# Patient Record
Sex: MALE | Race: BLACK OR AFRICAN AMERICAN | NOT HISPANIC OR LATINO | Employment: OTHER | URBAN - METROPOLITAN AREA
[De-identification: names, ages, dates, MRNs, and addresses within clinical notes are randomized per-mention and may not be internally consistent; named-entity substitution may affect disease eponyms.]

---

## 2018-06-28 ENCOUNTER — HOSPITAL ENCOUNTER (INPATIENT)
Facility: HOSPITAL | Age: 68
LOS: 6 days | Discharge: HOME | DRG: 189 | End: 2018-07-04
Attending: EMERGENCY MEDICINE | Admitting: INTERNAL MEDICINE
Payer: MEDICARE

## 2018-06-28 DIAGNOSIS — J44.1 COPD WITH ACUTE EXACERBATION: Primary | ICD-10-CM

## 2018-06-28 DIAGNOSIS — J44.1 COPD EXACERBATION: ICD-10-CM

## 2018-06-28 DIAGNOSIS — R06.02 SOB (SHORTNESS OF BREATH): ICD-10-CM

## 2018-06-28 DIAGNOSIS — R79.89 ELEVATED TROPONIN: ICD-10-CM

## 2018-06-28 DIAGNOSIS — D86.9 SARCOIDOSIS: ICD-10-CM

## 2018-06-28 LAB
ALBUMIN SERPL BCP-MCNC: 3.4 G/DL
ALLENS TEST: ABNORMAL
ALP SERPL-CCNC: 71 U/L
ALT SERPL W/O P-5'-P-CCNC: 20 U/L
ANION GAP SERPL CALC-SCNC: 14 MMOL/L
AST SERPL-CCNC: 18 U/L
BASOPHILS # BLD AUTO: 0.01 K/UL
BASOPHILS NFR BLD: 0.1 %
BILIRUB SERPL-MCNC: 1.1 MG/DL
BNP SERPL-MCNC: 674 PG/ML
BUN SERPL-MCNC: 14 MG/DL
CALCIUM SERPL-MCNC: 9.6 MG/DL
CHLORIDE SERPL-SCNC: 102 MMOL/L
CO2 SERPL-SCNC: 23 MMOL/L
CREAT SERPL-MCNC: 1.2 MG/DL
DELSYS: ABNORMAL
DIFFERENTIAL METHOD: ABNORMAL
EOSINOPHIL # BLD AUTO: 0.1 K/UL
EOSINOPHIL NFR BLD: 0.6 %
ERYTHROCYTE [DISTWIDTH] IN BLOOD BY AUTOMATED COUNT: 14.6 %
EST. GFR  (AFRICAN AMERICAN): >60 ML/MIN/1.73 M^2
EST. GFR  (NON AFRICAN AMERICAN): >60 ML/MIN/1.73 M^2
FLOW: 4
GLUCOSE SERPL-MCNC: 126 MG/DL
HCO3 UR-SCNC: 29.1 MMOL/L (ref 24–28)
HCT VFR BLD AUTO: 44.5 %
HGB BLD-MCNC: 14.9 G/DL
INR PPP: 1.1
LYMPHOCYTES # BLD AUTO: 0.8 K/UL
LYMPHOCYTES NFR BLD: 5.9 %
MCH RBC QN AUTO: 28.2 PG
MCHC RBC AUTO-ENTMCNC: 33.5 G/DL
MCV RBC AUTO: 84 FL
MODE: ABNORMAL
MONOCYTES # BLD AUTO: 1 K/UL
MONOCYTES NFR BLD: 7.1 %
NEUTROPHILS # BLD AUTO: 12 K/UL
NEUTROPHILS NFR BLD: 86.3 %
PCO2 BLDA: 40.7 MMHG (ref 35–45)
PH SMN: 7.46 [PH] (ref 7.35–7.45)
PLATELET # BLD AUTO: 181 K/UL
PMV BLD AUTO: 9.8 FL
PO2 BLDA: 23 MMHG (ref 40–60)
POC BE: 5 MMOL/L
POC SATURATED O2: 45 % (ref 95–100)
POC TCO2: 30 MMOL/L (ref 24–29)
POTASSIUM SERPL-SCNC: 3.5 MMOL/L
PROT SERPL-MCNC: 8.4 G/DL
PROTHROMBIN TIME: 12 SEC
RBC # BLD AUTO: 5.29 M/UL
SAMPLE: ABNORMAL
SODIUM SERPL-SCNC: 139 MMOL/L
SP02: 96
TROPONIN I SERPL DL<=0.01 NG/ML-MCNC: 0.15 NG/ML
WBC # BLD AUTO: 14.01 K/UL

## 2018-06-28 PROCEDURE — 96361 HYDRATE IV INFUSION ADD-ON: CPT

## 2018-06-28 PROCEDURE — 85610 PROTHROMBIN TIME: CPT

## 2018-06-28 PROCEDURE — 25500020 PHARM REV CODE 255: Performed by: EMERGENCY MEDICINE

## 2018-06-28 PROCEDURE — 99900035 HC TECH TIME PER 15 MIN (STAT)

## 2018-06-28 PROCEDURE — 82803 BLOOD GASES ANY COMBINATION: CPT

## 2018-06-28 PROCEDURE — 63600175 PHARM REV CODE 636 W HCPCS: Performed by: EMERGENCY MEDICINE

## 2018-06-28 PROCEDURE — 96365 THER/PROPH/DIAG IV INF INIT: CPT

## 2018-06-28 PROCEDURE — 93010 ELECTROCARDIOGRAM REPORT: CPT | Mod: ,,, | Performed by: INTERNAL MEDICINE

## 2018-06-28 PROCEDURE — 80053 COMPREHEN METABOLIC PANEL: CPT

## 2018-06-28 PROCEDURE — 25000242 PHARM REV CODE 250 ALT 637 W/ HCPCS: Performed by: EMERGENCY MEDICINE

## 2018-06-28 PROCEDURE — 99285 EMERGENCY DEPT VISIT HI MDM: CPT | Mod: 25

## 2018-06-28 PROCEDURE — 27000221 HC OXYGEN, UP TO 24 HOURS

## 2018-06-28 PROCEDURE — 21400001 HC TELEMETRY ROOM

## 2018-06-28 PROCEDURE — 84484 ASSAY OF TROPONIN QUANT: CPT

## 2018-06-28 PROCEDURE — 96375 TX/PRO/DX INJ NEW DRUG ADDON: CPT

## 2018-06-28 PROCEDURE — 85025 COMPLETE CBC W/AUTO DIFF WBC: CPT

## 2018-06-28 PROCEDURE — 25000003 PHARM REV CODE 250: Performed by: EMERGENCY MEDICINE

## 2018-06-28 PROCEDURE — 94761 N-INVAS EAR/PLS OXIMETRY MLT: CPT

## 2018-06-28 PROCEDURE — 94640 AIRWAY INHALATION TREATMENT: CPT

## 2018-06-28 PROCEDURE — 93005 ELECTROCARDIOGRAM TRACING: CPT

## 2018-06-28 PROCEDURE — 83880 ASSAY OF NATRIURETIC PEPTIDE: CPT

## 2018-06-28 RX ORDER — IPRATROPIUM BROMIDE AND ALBUTEROL SULFATE 2.5; .5 MG/3ML; MG/3ML
3 SOLUTION RESPIRATORY (INHALATION)
Status: COMPLETED | OUTPATIENT
Start: 2018-06-28 | End: 2018-06-28

## 2018-06-28 RX ORDER — CIPROFLOXACIN 2 MG/ML
400 INJECTION, SOLUTION INTRAVENOUS
Status: DISCONTINUED | OUTPATIENT
Start: 2018-06-29 | End: 2018-06-29

## 2018-06-28 RX ORDER — VALSARTAN 40 MG/1
20 TABLET ORAL DAILY
COMMUNITY

## 2018-06-28 RX ORDER — CIPROFLOXACIN 2 MG/ML
400 INJECTION, SOLUTION INTRAVENOUS
Status: DISCONTINUED | OUTPATIENT
Start: 2018-06-28 | End: 2018-06-28

## 2018-06-28 RX ORDER — ALBUTEROL SULFATE 2.5 MG/.5ML
5 SOLUTION RESPIRATORY (INHALATION)
Status: COMPLETED | OUTPATIENT
Start: 2018-06-28 | End: 2018-06-28

## 2018-06-28 RX ORDER — ASPIRIN 325 MG
325 TABLET, DELAYED RELEASE (ENTERIC COATED) ORAL
Status: COMPLETED | OUTPATIENT
Start: 2018-06-28 | End: 2018-06-28

## 2018-06-28 RX ORDER — METHYLPREDNISOLONE SOD SUCC 125 MG
125 VIAL (EA) INJECTION
Status: COMPLETED | OUTPATIENT
Start: 2018-06-28 | End: 2018-06-28

## 2018-06-28 RX ORDER — CIPROFLOXACIN 2 MG/ML
400 INJECTION, SOLUTION INTRAVENOUS ONCE
Status: COMPLETED | OUTPATIENT
Start: 2018-06-28 | End: 2018-06-28

## 2018-06-28 RX ORDER — PREDNISONE 5 MG/1
5 TABLET ORAL DAILY
COMMUNITY
End: 2023-07-11 | Stop reason: DRUGHIGH

## 2018-06-28 RX ORDER — IPRATROPIUM BROMIDE AND ALBUTEROL SULFATE 2.5; .5 MG/3ML; MG/3ML
3 SOLUTION RESPIRATORY (INHALATION) EVERY 6 HOURS PRN
Status: DISCONTINUED | OUTPATIENT
Start: 2018-06-29 | End: 2018-06-29

## 2018-06-28 RX ADMIN — ASPIRIN 325 MG: 325 TABLET, DELAYED RELEASE ORAL at 06:06

## 2018-06-28 RX ADMIN — CIPROFLOXACIN 400 MG: 2 INJECTION, SOLUTION INTRAVENOUS at 06:06

## 2018-06-28 RX ADMIN — IPRATROPIUM BROMIDE AND ALBUTEROL SULFATE 3 ML: .5; 2.5 SOLUTION RESPIRATORY (INHALATION) at 05:06

## 2018-06-28 RX ADMIN — METHYLPREDNISOLONE SODIUM SUCCINATE 125 MG: 125 INJECTION, POWDER, FOR SOLUTION INTRAMUSCULAR; INTRAVENOUS at 05:06

## 2018-06-28 RX ADMIN — SODIUM CHLORIDE 500 ML: 0.9 INJECTION, SOLUTION INTRAVENOUS at 05:06

## 2018-06-28 RX ADMIN — ALBUTEROL SULFATE 5 MG: 2.5 SOLUTION RESPIRATORY (INHALATION) at 08:06

## 2018-06-28 RX ADMIN — IOHEXOL 75 ML: 350 INJECTION, SOLUTION INTRAVENOUS at 07:06

## 2018-06-28 NOTE — ED PROVIDER NOTES
"Encounter Date: 6/28/2018  SORT MSE:  Pt is a 68 y.o. male who presents for emergent consideration for sob with 75% pulse ox. Pt will be moved to room when one is available, otherwise will wait in waiting room with triage nurse supervision.  Pt arrived by wheelchair. He is in distress. Orders have been placed. MERRICK Ramirez DNP [facility] 06/28/2018 1636       History     Chief Complaint   Patient presents with    Shortness of Breath     Sob for the past "few weeks" progressively worse     HPI     This 67 y/o male with prostate cancer s/p radioactive seed implants, HTN, pulmonary mycobacterial infection, sarcoidosis of lung, sick sinus syndrome s/p pacemaker placement who presents today for SOB and chest tightness.    The patient is c/o SOB and chest tightness for the past few wks. His symptoms worsen whenever he stands up and takes a few steps; resting alleviates his symptoms. The patient presented to ER in  on 6/20/18 c/o SOB and malodorous urine. He was given a breathing treatment at that facility and prescribed an unknown abx, which he started 5 days ago, for a UTI. The patient attempted x1 nebulizer breathing treatment at 8AM today and used his albuterol pump at 11 AM today with no relief to his symptoms. The patient reports 2L O2 at home, which he's had for 6 months. He states his COPD is well controlled. The patient is prescribed tacrolimus for his hx of sarcoidosis and prednisone for his hx of COPD. The patient denies hx of admission due to COPD. The patient denies smoking cigarettes.     The pt had a cardiac PET scan in 2014 that revealed normal LV function >51% with no myocardial ischemia or injury.    No fever, no chills, no N/V, no urinary/GI complaints, no headaches, no hx of DM, no hx of HTN, no hx of CHF, denies hx of DVT or PE, denies any prior sarcoid flairs, prior hx of MI prior ot     No recent hospitalizations.    Review of patient's allergies indicates:   Allergen Reactions    Simvastatin Other " (See Comments)     Muscular pain      Valium [diazepam] Palpitations     Past Medical History:   Diagnosis Date    Cancer     Prostate    Glaucoma     H/O prostate cancer     s/p radioactive seed implants    HTN (hypertension)     Pulmonary mycobacterial infection     Renal calculi     Sarcoidosis of lung     Sick sinus syndrome     s/p pacemaker placement     Past Surgical History:   Procedure Laterality Date    CARDIAC CATHETERIZATION  8-22-13    normal cornary arteries, no pulmonary HTN    CARDIAC PACEMAKER PLACEMENT      EYE SURGERY      heart biopsy      no evidence of sarcoid    prostate seed implants       Family History   Problem Relation Age of Onset    Heart disease Mother     Diabetes Mother     Cancer Father         lung    Colon cancer Neg Hx      Social History   Substance Use Topics    Smoking status: Never Smoker    Smokeless tobacco: Never Used    Alcohol use No     Review of Systems  Review of Systems   Constitutional: Negative for fevers, chills, diaphoresis and fever.   HENT: Negative for congestion, drooling, ear pain, nosebleeds, sore throat and trouble swallowing.    Eyes: Negative for photophobia, pain and discharge.   Respiratory: Per HPI.  Cardiovascular: Per HPI.   Gastrointestinal: Negative for abdominal pain, diarrhea, nausea and vomiting.   Genitourinary: Negative for difficulty urinating, dysuria, flank pain, frequency and hematuria.   Musculoskeletal: Negative for back pain, myalgias and neck pain.   Skin: Negative for rash.   Neurological: Negative for dizziness, seizures, syncope and headaches.   Psychiatric/Behavioral: Negative for behavioral problems.   Physical Exam     Initial Vitals   BP Pulse Resp Temp SpO2   06/28/18 1642 06/28/18 1640 06/28/18 1640 -- 06/28/18 1640   (!) 162/91 (!) 125 (!) 32  (!) 76 %      MAP       --                Physical Exam  Constitutional: Pt appears well-developed and well-nourished. Mild to moderate respiratory distress.    HENT:   Head: Normocephalic and atraumatic.   Mouth/Throat: No oropharyngeal exudate.   Eyes: Conjunctivae and EOM are normal. Pupils are equal, round, and reactive to light. No scleral icterus.   Neck: Normal range of motion. Neck supple. No JVD present.   Cardiovascular: Normal rate, regular rhythm and normal heart sounds. Exam reveals no gallop and no friction rub.    No murmur heard.  Pulmonary/Chest: No stridor. Pt has no wheezes. Pt has no rhonchi. Diminished breath sounds to all 4 lung fields. Mild to moderate respiratory distress. Pt is on 4L O2 with 94% sat.  Abdominal: Soft. Bowel sounds are normal. Pt exhibits no distension and no mass. There is no tenderness. There is no rebound and no guarding.   Musculoskeletal: Normal range of motion. Pt exhibits no edema or tenderness.   Lymphadenopathy:     Pt has no cervical adenopathy.   Neurological: Pt is alert and oriented to person, place, and time. Pt has normal strength and normal reflexes. Pt displays normal reflexes. No cranial nerve deficit or sensory deficit.   Skin: Skin is warm and dry. Capillary refill takes less than 2 seconds. No rash and no abscess noted. No erythema.     ED Course   Procedures  Labs Reviewed   COMPREHENSIVE METABOLIC PANEL - Abnormal; Notable for the following:        Result Value    Glucose 126 (*)     Albumin 3.4 (*)     Total Bilirubin 1.1 (*)     All other components within normal limits   CBC W/ AUTO DIFFERENTIAL - Abnormal; Notable for the following:     WBC 14.01 (*)     RDW 14.6 (*)     Gran # (ANC) 12.0 (*)     Lymph # 0.8 (*)     Gran% 86.3 (*)     Lymph% 5.9 (*)     All other components within normal limits   TROPONIN I - Abnormal; Notable for the following:     Troponin I 0.151 (*)     All other components within normal limits   B-TYPE NATRIURETIC PEPTIDE - Abnormal; Notable for the following:      (*)     All other components within normal limits   ISTAT PROCEDURE - Abnormal; Notable for the following:      POC PH 7.462 (*)     POC PO2 23 (*)     POC HCO3 29.1 (*)     POC SATURATED O2 45 (*)     POC TCO2 30 (*)     All other components within normal limits   PROTIME-INR   TROPONIN I          Imaging Results          CTA Chest Non-Coronary (PE Study) (Final result)  Result time 06/28/18 19:23:20    Final result by Eamon Mcintosh MD (06/28/18 19:23:20)                 Impression:      No evidence of central pulmonary embolism.  Distal and subsegmental pulmonary emboli are not excluded.  Suggest correlation with DVT study and additional clinical parameters.    Enlargement of the pulmonary trunk and main pulmonary arteries.    Small bilateral pleural effusions with some loculations to the effusions.    Cavitary lesions in the upper lobes with associated scarring along with mediastinal lymphadenopathy.  The findings represent granulomatous disease (possible tuberculosis and sarcoidosis). Suggest clinical correlation.    Diffuse ground-glass airspace opacities.    Additional findings as above.      Electronically signed by: Eamon Mcintosh MD  Date:    06/28/2018  Time:    19:23             Narrative:    EXAMINATION:  CTA CHEST NON CORONARY    CLINICAL HISTORY:  Chest pain, acute, PE suspected, high pretest prob;    TECHNIQUE:  Low dose axial images, sagittal and coronal reformations were obtained from the thoracic inlet to the lung bases following the IV administration of 75 mL of Omnipaque 350.  Contrast timing was optimized to evaluate the pulmonary arteries.  MIP images were performed.    COMPARISON:  Chest x-ray dated 06/20/2018.    FINDINGS:  CT pulmonary embolism examination:    There are no filling defects within the central pulmonary tree to suggest pulmonary embolism.  The distal and subsegmental pulmonary tree are poorly visualized secondary to motion.  There is dilatation of the pulmonary trunk and main pulmonary arteries.    CT chest examination:    The thyroid gland is unremarkable.  The base of the neck is within  normal limits.    The trachea is within normal limits.  No definitive endobronchial lesions identified.  There is bronchiectasis involving the bilateral lower lobes.    The heart is enlarged.  There is reflux of contrast into the IVC and the hepatic veins.  There is left-sided approach dual lead pacemaker with the tip terminating in the right atrial appendage and right ventricle.  No pericardial effusions are present.  There are scattered coronary artery calcifications.    The ascending thoracic aorta measures 3.5 x 3.5 cm.  The descending thoracic aorta measures 2.7 x 2.7 cm.  There is no intimal flap to suggest a dissection.  There is no evidence of a penetrating aortic ulcer.  The great vessels arising from the aortic arch are within normal limits.    There are partially calcified prevascular, para-aortic, paratracheal, and subcarinal lymph nodes.  No definitive enlarged lymph nodes identified in the hilar lymph node stations.  The axillary regions are within normal limits.    There are small bilateral pleural effusions.  The effusions appear to be loculated especially those involving the apical segments of the upper lobes.  There is no evidence of a pneumothorax.  There is no evidence of pneumomediastinum.  There are diffuse ground-glass airspace opacities throughout the lung fields.  There are cavitary lesions in the bilateral upper lobes with associated scarring.  No significant soft tissue component is identified.    The esophagus is within normal limits.  The visualized upper abdominal structures are unremarkable.  No evidence of free air identified in the upper abdomen.    The chest wall is unremarkable.  There are degenerative changes in the osseous structures.                               X-Ray Chest AP Portable (Final result)  Result time 06/28/18 17:23:10    Final result by Kevin Zaragoza MD (06/28/18 17:23:10)                 Impression:      Findings suggestive mild pulmonary edema.    Mild  amount of opacity in the right lower lung zone which may represent pulmonary edema pneumonia, or atelectasis.      Electronically signed by: Kevin Zaragoza MD  Date:    06/28/2018  Time:    17:23             Narrative:    EXAMINATION:  XR CHEST AP PORTABLE    CLINICAL HISTORY:  Shortness of breath    TECHNIQUE:  Single frontal view of the chest was performed.    COMPARISON:  None    FINDINGS:  Cardiac silhouette is at the upper limits of normal in size.  Tortuosity of the thoracic aorta.  Cardiac pacer with leads in the right atrium and right ventricle.  Prominence of the pulmonary vasculature with scattered interstitial lung markings which may represent mild pulmonary edema.  Patchy opacity in the right lower lung zone which may represent pneumonia, atelectasis, or pulmonary edema.  Trace left pleural effusion.  Degenerative changes of the thoracic spine.                                                      Clinical Impression:   The primary encounter diagnosis was Sarcoidosis. Diagnoses of SOB (shortness of breath), COPD exacerbation, and Elevated troponin were also pertinent to this visit.    MD NOTE, 5:10pm: EKG showed 118 bpm, sinus tachycardia, incomplete right bundle branch block, NO STEMI.    MD NOTE, 5:24pm: Pt presents with initial sats of 76% in triage, he was in mild to moderate respiratory distress, I placed him on 4 L and he is stable at 90%, he lives at 2L, his lungs are clear so unclear if this is a COPD exacerbation (most likely) vs. PE presentation vs. CHF (no hx of CHF, no JVD, no crackles, no edema, last EF as noted >50%), will get full labs, trops, check H/H, check Ct, further check CXR/CTA to r/o PNA and PE, pt also reports CP and ACS hx with PM placement and prior SSS, so will need admission both for respiratory failure + CP rule out, pt remains HD stable, IVF and Abx initiated      MD NOTE, 8:02pm: Pt stable nad vss, pt is stable now on double 02 requirements, 4L, normally on 2L, when he  stands or tries to walks he gets extremely SOB, pt has a leukocytosis of unclear etiology, on Cipro currently for G- coverage for COPD flair, H/H stable, INR 1.1, CMP unimpressive, sugars 126, of note troponin 0.151, possibly demand ischemia but will certainly need trending given hx of CP and SOB, his BNP is elevated at 674 and mild PVC on CXR, will give 40 IV Lasix here, VBG is reassuring, suspect COPD exacerbation vs. Sarcoidosis flair, pt did have improvement with one round of nebs so will administer one more, s/p IV steroids, otherwise will page the hospitalist for admission    MD NOTE, 8:45pm: Pt stable nad vss, I spoke to Dr. Brand who accepts the admission, we discussed all labs, imaging, hx and physical examination, and vitals, he agrees with plan of care for nebs, steroids, Abx, patient is currently is stable in NAD, of note there is no temperature charted so advised nursing to record the temperature obtained in triage, otherwise pt stable on admission, labs and VS as noted, and stable on admission              Leonela Mohr MD  06/28/18 2052

## 2018-06-28 NOTE — ED TRIAGE NOTES
Pt and wife were moving today from [place] to [place]. Pt became SOB with activity and home O2 did not relieve distress. Pt came in with sats in the lower 80's to ED room.

## 2018-06-28 NOTE — PROGRESS NOTES
Results for MART LAMA (MRN [id number]) as of 6/28/2018 18:04   Ref. Range 6/28/2018 17:55   POC PH Latest Ref Range: 7.35 - 7.45  7.462 (H)   POC PCO2 Latest Ref Range: 35 - 45 mmHg 40.7   POC PO2 Latest Ref Range: 40 - 60 mmHg 23 (LL)   POC BE Latest Ref Range: -2 to 2 mmol/L 5   POC HCO3 Latest Ref Range: 24 - 28 mmol/L 29.1 (H)   POC SATURATED O2 Latest Ref Range: 95 - 100 % 45 (L)   POC TCO2 Latest Ref Range: 24 - 29 mmol/L 30 (H)   Flow Unknown 4   Sample Unknown VENOUS   DelSys Unknown Nasal Can   Allens Test Unknown N/A   Mode Unknown SPONT

## 2018-06-29 PROBLEM — Z85.46 HISTORY OF PROSTATE CANCER: Chronic | Status: ACTIVE | Noted: 2018-06-29

## 2018-06-29 PROBLEM — J96.11 CHRONIC RESPIRATORY FAILURE WITH HYPOXIA: Chronic | Status: ACTIVE | Noted: 2018-06-29

## 2018-06-29 PROBLEM — J44.1 COPD WITH ACUTE EXACERBATION: Status: ACTIVE | Noted: 2018-06-29

## 2018-06-29 PROBLEM — R79.89 ELEVATED TROPONIN: Status: ACTIVE | Noted: 2018-06-29

## 2018-06-29 PROBLEM — J44.9 COPD (CHRONIC OBSTRUCTIVE PULMONARY DISEASE): Chronic | Status: ACTIVE | Noted: 2018-06-29

## 2018-06-29 PROBLEM — Z86.79 HISTORY OF SICK SINUS SYNDROME: Chronic | Status: ACTIVE | Noted: 2018-06-29

## 2018-06-29 PROBLEM — I10 ESSENTIAL HYPERTENSION: Chronic | Status: ACTIVE | Noted: 2018-06-29

## 2018-06-29 PROBLEM — E78.5 HYPERLIPIDEMIA: Chronic | Status: ACTIVE | Noted: 2018-06-29

## 2018-06-29 PROBLEM — D86.9 SARCOIDOSIS: Chronic | Status: ACTIVE | Noted: 2018-06-28

## 2018-06-29 PROBLEM — Z95.0 PRESENCE OF PERMANENT CARDIAC PACEMAKER: Chronic | Status: ACTIVE | Noted: 2018-06-29

## 2018-06-29 LAB
ALBUMIN SERPL BCP-MCNC: 3 G/DL
ALP SERPL-CCNC: 62 U/L
ALT SERPL W/O P-5'-P-CCNC: 16 U/L
ANION GAP SERPL CALC-SCNC: 12 MMOL/L
AST SERPL-CCNC: 12 U/L
BASOPHILS # BLD AUTO: 0 K/UL
BASOPHILS NFR BLD: 0 %
BILIRUB SERPL-MCNC: 0.7 MG/DL
BUN SERPL-MCNC: 16 MG/DL
CALCIUM SERPL-MCNC: 9.6 MG/DL
CHLORIDE SERPL-SCNC: 104 MMOL/L
CO2 SERPL-SCNC: 24 MMOL/L
CREAT SERPL-MCNC: 1.4 MG/DL
DIFFERENTIAL METHOD: ABNORMAL
EOSINOPHIL # BLD AUTO: 0 K/UL
EOSINOPHIL NFR BLD: 0 %
ERYTHROCYTE [DISTWIDTH] IN BLOOD BY AUTOMATED COUNT: 14.6 %
EST. GFR  (AFRICAN AMERICAN): >60 ML/MIN/1.73 M^2
EST. GFR  (NON AFRICAN AMERICAN): 52 ML/MIN/1.73 M^2
GLUCOSE SERPL-MCNC: 208 MG/DL
HCT VFR BLD AUTO: 43 %
HGB BLD-MCNC: 13.9 G/DL
LYMPHOCYTES # BLD AUTO: 0.3 K/UL
LYMPHOCYTES NFR BLD: 3.6 %
MAGNESIUM SERPL-MCNC: 2 MG/DL
MCH RBC QN AUTO: 28.4 PG
MCHC RBC AUTO-ENTMCNC: 32.3 G/DL
MCV RBC AUTO: 88 FL
MONOCYTES # BLD AUTO: 0.2 K/UL
MONOCYTES NFR BLD: 2 %
NEUTROPHILS # BLD AUTO: 8.4 K/UL
NEUTROPHILS NFR BLD: 94.4 %
PHOSPHATE SERPL-MCNC: 2.7 MG/DL
PLATELET # BLD AUTO: 155 K/UL
PMV BLD AUTO: 10.5 FL
POTASSIUM SERPL-SCNC: 5 MMOL/L
PROT SERPL-MCNC: 8 G/DL
RBC # BLD AUTO: 4.89 M/UL
SODIUM SERPL-SCNC: 140 MMOL/L
TROPONIN I SERPL DL<=0.01 NG/ML-MCNC: 0.13 NG/ML
TROPONIN I SERPL DL<=0.01 NG/ML-MCNC: 0.15 NG/ML
WBC # BLD AUTO: 8.87 K/UL

## 2018-06-29 PROCEDURE — 84100 ASSAY OF PHOSPHORUS: CPT

## 2018-06-29 PROCEDURE — G0009 ADMIN PNEUMOCOCCAL VACCINE: HCPCS | Performed by: INTERNAL MEDICINE

## 2018-06-29 PROCEDURE — 63600175 PHARM REV CODE 636 W HCPCS: Performed by: INTERNAL MEDICINE

## 2018-06-29 PROCEDURE — 25000003 PHARM REV CODE 250: Performed by: INTERNAL MEDICINE

## 2018-06-29 PROCEDURE — 80053 COMPREHEN METABOLIC PANEL: CPT

## 2018-06-29 PROCEDURE — 84484 ASSAY OF TROPONIN QUANT: CPT | Mod: 91

## 2018-06-29 PROCEDURE — 27000221 HC OXYGEN, UP TO 24 HOURS

## 2018-06-29 PROCEDURE — 83735 ASSAY OF MAGNESIUM: CPT

## 2018-06-29 PROCEDURE — 90670 PCV13 VACCINE IM: CPT | Performed by: INTERNAL MEDICINE

## 2018-06-29 PROCEDURE — 85025 COMPLETE CBC W/AUTO DIFF WBC: CPT

## 2018-06-29 PROCEDURE — 94640 AIRWAY INHALATION TREATMENT: CPT

## 2018-06-29 PROCEDURE — 21400001 HC TELEMETRY ROOM

## 2018-06-29 PROCEDURE — 94761 N-INVAS EAR/PLS OXIMETRY MLT: CPT

## 2018-06-29 PROCEDURE — 84484 ASSAY OF TROPONIN QUANT: CPT

## 2018-06-29 PROCEDURE — 25000242 PHARM REV CODE 250 ALT 637 W/ HCPCS: Performed by: INTERNAL MEDICINE

## 2018-06-29 PROCEDURE — 3E0234Z INTRODUCTION OF SERUM, TOXOID AND VACCINE INTO MUSCLE, PERCUTANEOUS APPROACH: ICD-10-PCS | Performed by: INTERNAL MEDICINE

## 2018-06-29 PROCEDURE — 36415 COLL VENOUS BLD VENIPUNCTURE: CPT

## 2018-06-29 PROCEDURE — 90471 IMMUNIZATION ADMIN: CPT | Performed by: INTERNAL MEDICINE

## 2018-06-29 RX ORDER — GUAIFENESIN 600 MG/1
600 TABLET, EXTENDED RELEASE ORAL 2 TIMES DAILY
Status: DISCONTINUED | OUTPATIENT
Start: 2018-06-29 | End: 2018-07-04 | Stop reason: HOSPADM

## 2018-06-29 RX ORDER — IPRATROPIUM BROMIDE AND ALBUTEROL SULFATE 2.5; .5 MG/3ML; MG/3ML
3 SOLUTION RESPIRATORY (INHALATION)
Status: DISCONTINUED | OUTPATIENT
Start: 2018-06-29 | End: 2018-06-30

## 2018-06-29 RX ORDER — IPRATROPIUM BROMIDE AND ALBUTEROL SULFATE 2.5; .5 MG/3ML; MG/3ML
3 SOLUTION RESPIRATORY (INHALATION) EVERY 4 HOURS PRN
Status: DISCONTINUED | OUTPATIENT
Start: 2018-06-29 | End: 2018-06-30

## 2018-06-29 RX ORDER — LISINOPRIL 5 MG/1
5 TABLET ORAL DAILY
Status: DISCONTINUED | OUTPATIENT
Start: 2018-06-29 | End: 2018-06-29

## 2018-06-29 RX ORDER — PANTOPRAZOLE SODIUM 40 MG/1
40 TABLET, DELAYED RELEASE ORAL DAILY
Status: DISCONTINUED | OUTPATIENT
Start: 2018-06-29 | End: 2018-07-04 | Stop reason: HOSPADM

## 2018-06-29 RX ORDER — NAPROXEN SODIUM 220 MG/1
81 TABLET, FILM COATED ORAL DAILY
Status: DISCONTINUED | OUTPATIENT
Start: 2018-06-29 | End: 2018-07-04 | Stop reason: HOSPADM

## 2018-06-29 RX ORDER — METOPROLOL SUCCINATE 50 MG/1
200 TABLET, EXTENDED RELEASE ORAL DAILY
Status: DISCONTINUED | OUTPATIENT
Start: 2018-06-29 | End: 2018-06-29

## 2018-06-29 RX ORDER — RAMELTEON 8 MG/1
8 TABLET ORAL NIGHTLY PRN
Status: DISCONTINUED | OUTPATIENT
Start: 2018-06-29 | End: 2018-07-04 | Stop reason: HOSPADM

## 2018-06-29 RX ORDER — METHYLPREDNISOLONE SOD SUCC 125 MG
80 VIAL (EA) INJECTION EVERY 8 HOURS
Status: DISCONTINUED | OUTPATIENT
Start: 2018-06-29 | End: 2018-07-01

## 2018-06-29 RX ORDER — METHYLPREDNISOLONE SOD SUCC 125 MG
125 VIAL (EA) INJECTION EVERY 8 HOURS
Status: DISCONTINUED | OUTPATIENT
Start: 2018-06-29 | End: 2018-06-29

## 2018-06-29 RX ORDER — METOPROLOL SUCCINATE 50 MG/1
100 TABLET, EXTENDED RELEASE ORAL DAILY
Status: DISCONTINUED | OUTPATIENT
Start: 2018-06-30 | End: 2018-07-04 | Stop reason: HOSPADM

## 2018-06-29 RX ORDER — PRAVASTATIN SODIUM 40 MG/1
40 TABLET ORAL DAILY
Status: DISCONTINUED | OUTPATIENT
Start: 2018-06-29 | End: 2018-06-30

## 2018-06-29 RX ORDER — CLONIDINE HYDROCHLORIDE 0.1 MG/1
0.1 TABLET ORAL 3 TIMES DAILY PRN
Status: DISCONTINUED | OUTPATIENT
Start: 2018-06-29 | End: 2018-07-04 | Stop reason: HOSPADM

## 2018-06-29 RX ORDER — AMLODIPINE BESYLATE 5 MG/1
5 TABLET ORAL DAILY
Status: DISCONTINUED | OUTPATIENT
Start: 2018-06-29 | End: 2018-06-30

## 2018-06-29 RX ORDER — ONDANSETRON 2 MG/ML
8 INJECTION INTRAMUSCULAR; INTRAVENOUS EVERY 8 HOURS PRN
Status: DISCONTINUED | OUTPATIENT
Start: 2018-06-29 | End: 2018-07-04 | Stop reason: HOSPADM

## 2018-06-29 RX ORDER — AMOXICILLIN 250 MG
1 CAPSULE ORAL 2 TIMES DAILY PRN
Status: DISCONTINUED | OUTPATIENT
Start: 2018-06-29 | End: 2018-07-04 | Stop reason: HOSPADM

## 2018-06-29 RX ADMIN — LISINOPRIL 5 MG: 5 TABLET ORAL at 09:06

## 2018-06-29 RX ADMIN — GUAIFENESIN 600 MG: 600 TABLET, EXTENDED RELEASE ORAL at 09:06

## 2018-06-29 RX ADMIN — PANTOPRAZOLE SODIUM 40 MG: 40 TABLET, DELAYED RELEASE ORAL at 09:06

## 2018-06-29 RX ADMIN — AMLODIPINE BESYLATE 5 MG: 5 TABLET ORAL at 09:06

## 2018-06-29 RX ADMIN — IPRATROPIUM BROMIDE AND ALBUTEROL SULFATE 3 ML: .5; 2.5 SOLUTION RESPIRATORY (INHALATION) at 08:06

## 2018-06-29 RX ADMIN — PNEUMOCOCCAL 13-VALENT CONJUGATE VACCINE 0.5 ML: 2.2; 2.2; 2.2; 2.2; 2.2; 4.4; 2.2; 2.2; 2.2; 2.2; 2.2; 2.2; 2.2 INJECTION, SUSPENSION INTRAMUSCULAR at 09:06

## 2018-06-29 RX ADMIN — METHYLPREDNISOLONE SODIUM SUCCINATE 80 MG: 125 INJECTION, POWDER, FOR SOLUTION INTRAMUSCULAR; INTRAVENOUS at 09:06

## 2018-06-29 RX ADMIN — IPRATROPIUM BROMIDE AND ALBUTEROL SULFATE 3 ML: .5; 2.5 SOLUTION RESPIRATORY (INHALATION) at 11:06

## 2018-06-29 RX ADMIN — IPRATROPIUM BROMIDE AND ALBUTEROL SULFATE 3 ML: .5; 2.5 SOLUTION RESPIRATORY (INHALATION) at 03:06

## 2018-06-29 RX ADMIN — ASPIRIN 81 MG 81 MG: 81 TABLET ORAL at 09:06

## 2018-06-29 RX ADMIN — IPRATROPIUM BROMIDE AND ALBUTEROL SULFATE 3 ML: .5; 2.5 SOLUTION RESPIRATORY (INHALATION) at 07:06

## 2018-06-29 RX ADMIN — AZITHROMYCIN MONOHYDRATE 500 MG: 500 INJECTION, POWDER, LYOPHILIZED, FOR SOLUTION INTRAVENOUS at 06:06

## 2018-06-29 RX ADMIN — VALSARTAN 20 MG: 40 TABLET, FILM COATED ORAL at 09:06

## 2018-06-29 RX ADMIN — METOPROLOL SUCCINATE 200 MG: 50 TABLET, EXTENDED RELEASE ORAL at 09:06

## 2018-06-29 RX ADMIN — METHYLPREDNISOLONE SODIUM SUCCINATE 125 MG: 125 INJECTION, POWDER, FOR SOLUTION INTRAMUSCULAR; INTRAVENOUS at 06:06

## 2018-06-29 RX ADMIN — METHYLPREDNISOLONE SODIUM SUCCINATE 80 MG: 125 INJECTION, POWDER, FOR SOLUTION INTRAMUSCULAR; INTRAVENOUS at 01:06

## 2018-06-29 NOTE — HPI
Mr. Levi Melendez is a 68 y.o. male with essential hypertension, hyperlipidemia (LDL unknown), COPD, chronic respiratory failure with hypoxia, sarcoidosis, history of sick sinus syndrome with permanent pacemaker in place, and history of prostate cancer who presents to [facility] ED with complaints of progressively worsening dyspnea for three weeks.  He reports that initially the shortness of breath would limit his mobility to one flight of stairs but it has progressively worsened to the point in the last couple of days that just maoing around in bed makes him dyspneic.  He feels fine at rest but does say that he is using his as-needed supplemental oxygen almost constantly now.  His dyspnea is associated with both wheezing and coughing that is occasionally productive of blood-tinged sputum; he does say that he has chest soreness only when he coughs.  He has subjective fevers and chills and reports that his appetite has been poor.  He has not had any sick contacts or recent travel.  He denies any lower extremity pain or swelling and has not experienced any palpitations or diaphoresis.

## 2018-06-29 NOTE — ASSESSMENT & PLAN NOTE
Patient has a known history of COPD but also with sarcoidosis.  I have reviewed the chest X-ray and it reveals diffuse pulmonary edema along with small bilateral pleural effusions.  CT-A chest was negative for pulmonary embolism.  He will be started on frequent nebulized WINTER/LAMA; intravenous corticosteroids; and empiric antibiotics.

## 2018-06-29 NOTE — ASSESSMENT & PLAN NOTE
Patient's first two sets of troponins are flat with the first measurement being 0.151 and a repeat of 0.154.  This is likely due to demand.  I have reviewed the EKG and it reveals sinus tachycardia without evidence of acute ischemia.  Will continue to monitor.

## 2018-06-29 NOTE — PLAN OF CARE
TN met with pt at bedside. TN explained role in Case Management/Discharge Planning. TN inquired about HELP AT HOME. Pt stated that pt will have help at home with spouse Akosua. TN reviewed HELP AT HOME and DISCHARGE PLANNING brochure of questions to think about while in the hospital. Pt voiced understanding. TN inquired about what pt feels she is RESPONSIBLE for to MANAGE HEALTH AT HOME. Pt stated going to MD appointments and picking up any/all prescriptions and taking as prescribed. Pt able to demonstrate understanding using teach back method.          06/29/18 1050   Discharge Assessment   Assessment Type Discharge Planning Assessment   Confirmed/corrected address and phone number on facesheet? Yes   Assessment information obtained from? Patient   Prior to hospitilization cognitive status: Alert/Oriented   Prior to hospitalization functional status: Independent;Assistive Equipment   Current cognitive status: Alert/Oriented   Current Functional Status: Independent;Assistive Equipment   Facility Arrived From: home   Lives With spouse   Able to Return to Prior Arrangements yes   Is patient able to care for self after discharge? Yes   Who are your caregiver(s) and their phone number(s)? spouse Akosua- [phone]   Patient's perception of discharge disposition home or selfcare   Readmission Within The Last 30 Days no previous admission in last 30 days   Patient currently being followed by outpatient case management? No   Patient currently receives any other outside agency services? No   Equipment Currently Used at Home CPAP;nebulizer;oxygen   Do you have any problems affording any of your prescribed medications? No   Is the patient taking medications as prescribed? yes   Does the patient have transportation home? Yes   Transportation Available family or friend will provide;car   Does the patient receive services at the [facility]? No   Discharge Plan A Home with family   Discharge Plan B Home with family  (pt  reports that he has multiple follow up appointments scheduled starting next month all the way to November but at this time is not sure of the exact days and times but that he has the information at home)   Patient/Family In Agreement With Plan yes     [facility] TN [facility] and that at time of discharge he will need [facility]

## 2018-06-29 NOTE — PLAN OF CARE
Problem: Fall Risk (Adult)  Intervention: Monitor/Assist with Self Care   18   Activity   Activity Assistance Provided assistance, 1 person   Daily Care Interventions   Self-Care Promotion BADL personal objects within reach;independence encouraged   Functional Level Current   Ambulation 2 - assistive person   Transferring 2 - assistive person   Toileting 2 - assistive person   Bathing 2 - assistive person   Dressing 2 - assistive person   Eating 0 - independent   Communication 0 - understands/communicates without difficulty   Swallowing 0 - swallows foods/liquids without difficulty     Intervention: Reduce Risk/Promote Restraint Free Environment   18   Safety Interventions   Environmental Safety Modification assistive device/personal items within reach;clutter free environment maintained   Prevent [place] Drop/Fall   Safety/Security Measures bed alarm set     Intervention: Review Medications/Identify Contributors to Fall Risk   18   Safety Interventions   Medication Review/Management medications reviewed       Goal: Absence of Falls  Patient will demonstrate the desired outcomes by discharge/transition of care.   Outcome: Ongoing (interventions implemented as appropriate)   18   Fall Risk (Adult)   Absence of Falls making progress toward outcome

## 2018-06-29 NOTE — PLAN OF CARE
Problem: Patient Care Overview  Goal: Plan of Care Review  Patient received on nasal cannula 4 lpm. Aerosol treatment given as ordered. BBS EQUAL reported. Patient oxygen level dropped less than 90% in a short period of time when taken off oxygen to be placed on aerosol treatment .

## 2018-06-29 NOTE — SUBJECTIVE & OBJECTIVE
Past Medical History:   Diagnosis Date    Cancer     Prostate    Glaucoma     H/O prostate cancer     s/p radioactive seed implants    HTN (hypertension)     Pulmonary mycobacterial infection     Renal calculi     Sarcoidosis of lung     Sick sinus syndrome     s/p pacemaker placement       Past Surgical History:   Procedure Laterality Date    CARDIAC CATHETERIZATION  8-22-13    normal cornary arteries, no pulmonary HTN    CARDIAC PACEMAKER PLACEMENT      EYE SURGERY      heart biopsy      no evidence of sarcoid    prostate seed implants         Review of patient's allergies indicates:   Allergen Reactions    Simvastatin Other (See Comments)     Muscular pain      Valium [diazepam] Palpitations       No current facility-administered medications on file prior to encounter.      Current Outpatient Prescriptions on File Prior to Encounter   Medication Sig    acetaminophen (TYLENOL) 500 MG tablet Take 500 mg by mouth every 6 (six) hours as needed.    albuterol (PROVENTIL) 2.5 mg /3 mL (0.083 %) nebulizer solution Take 2.5 mg by nebulization every 6 (six) hours as needed.    amlodipine (NORVASC) 5 MG tablet Take 5 mg by mouth once daily.    aspirin 81 MG Chew Take 81 mg by mouth once daily.    brimonidine 0.2% (ALPHAGAN) 0.2 % Drop 1 drop every 8 (eight) hours.    latanoprost 0.005 % ophthalmic solution 1 drop every evening.    lisinopril (PRINIVIL,ZESTRIL) 5 MG tablet Take 5 mg by mouth once daily.    metoprolol succinate (TOPROL-XL) 200 MG 24 hr tablet Take 200 mg by mouth once daily.    pravastatin (PRAVACHOL) 80 MG tablet Take 40 mg by mouth once daily.    tacrolimus (PROTOPIC) 0.03 % ointment Apply topically 2 (two) times daily.    multivitamin with minerals tablet Take 1 tablet by mouth once daily.     Family History     Problem Relation (Age of Onset)    Cancer Father    Diabetes Mother    Heart disease Mother        Social History Main Topics    Smoking status: Never Smoker     Smokeless tobacco: Never Used    Alcohol use No    Drug use: No    Sexual activity: Not on file     Review of Systems   Constitutional: Positive for activity change, appetite change, chills, fatigue and fever. Negative for diaphoresis and unexpected weight change.   HENT: Negative.    Eyes: Negative.    Respiratory: Positive for cough, shortness of breath and wheezing. Negative for chest tightness.    Cardiovascular: Negative for chest pain, palpitations and leg swelling.   Gastrointestinal: Negative for abdominal distention, abdominal pain, blood in stool, constipation, diarrhea, nausea and vomiting.   Genitourinary: Negative for dysuria and hematuria.   Musculoskeletal: Negative.    Skin: Negative.    Neurological: Positive for weakness. Negative for dizziness, seizures, syncope and light-headedness.   Psychiatric/Behavioral: Negative.      Objective:     Vital Signs (Most Recent):  Temp: 98.5 °F (36.9 °C) (06/29/18 0436)  Pulse: 90 (06/29/18 0436)  Resp: 19 (06/29/18 0436)  BP: 123/81 (06/29/18 0436)  SpO2: 97 % (06/29/18 0436) Vital Signs (24h Range):  Temp:  [98.5 °F (36.9 °C)-100.1 °F (37.8 °C)] 98.5 °F (36.9 °C)  Pulse:  [] 90  Resp:  [19-32] 19  SpO2:  [76 %-97 %] 97 %  BP: (123-185)/(77-94) 123/81     Weight: 92.9 kg (204 lb 12.9 oz)  Body mass index is 27.78 kg/m².    Physical Exam   Constitutional: He is oriented to person, place, and time. He appears well-developed and well-nourished. No distress.   HENT:   Head: Normocephalic and atraumatic.   Right Ear: External ear normal.   Left Ear: External ear normal.   Nose: Nose normal.   Eyes: Right eye exhibits no discharge. Left eye exhibits no discharge.   Neck: Normal range of motion.   Cardiovascular: Normal rate, regular rhythm, normal heart sounds and intact distal pulses.  Exam reveals no gallop and no friction rub.    No murmur heard.  Pulmonary/Chest:   Mildly increased work of breathing with otherwise clear breath sounds bilaterally    Abdominal: Soft. Bowel sounds are normal. He exhibits no distension. There is no tenderness. There is no rebound and no guarding.   Musculoskeletal: Normal range of motion. He exhibits no edema.   Neurological: He is alert and oriented to person, place, and time.   Skin: Skin is warm and dry. He is not diaphoretic. No erythema.   Psychiatric: He has a normal mood and affect. His behavior is normal. Judgment and thought content normal.   Nursing note and vitals reviewed.          Significant Labs: All pertinent labs within the past 24 hours have been reviewed.    Significant Imaging: I have reviewed and interpreted all pertinent imaging results/findings within the past 24 hours.

## 2018-06-29 NOTE — CARE UPDATE
Interval Progress Note    I agree with my colleague's assessment and plan at time of admission. I personally evaluated Mr Melendez today. Is in no distress, non toxic appearing, AAOx3 and breathing comfortably on low flow NC. Feels better but symptomatic with minimal activity. No wheezing, rales or ronchi on exam. CT with no PE but with cavitary lesions. He has no knowledge of this although with known sarcoidosis. He was admitted to [facility] ago and [facility]. I will request records from both placed to ensure this is related to sarcoidosis. Will continue with steroids, azithromycin and bronchodilator therapy. Has portable O2 at home.

## 2018-06-29 NOTE — NURSING
Pt arrived from ED via stretcher on 3L NC and ambulated to bed with no assist. Tele box was applied and admit and assessment completed. Pt appears in NAD at this time, will continue with current plan of care.

## 2018-06-29 NOTE — H&P
[facility] Hospital Medicine  History & Physical    Patient Name: Levi Melendez  MRN: [id number]  Admission Date: 6/28/2018  Attending Physician: Nevaeh Caldera MD   Primary Care Provider: Tasha Brown MD         Patient information was obtained from patient.     Subjective:     Principal Problem:COPD with acute exacerbation    Chief Complaint: Worsening shortness of breath for three weeks.    HPI: Mr. Levi Melendez is a 68 y.o. male with essential hypertension, hyperlipidemia (LDL unknown), COPD, chronic respiratory failure with hypoxia, sarcoidosis, history of sick sinus syndrome with permanent pacemaker in place, and history of prostate cancer who presents to [facility] ED with complaints of progressively worsening dyspnea for three weeks.  He reports that initially the shortness of breath would limit his mobility to one flight of stairs but it has progressively worsened to the point in the last couple of days that just maoing around in bed makes him dyspneic.  He feels fine at rest but does say that he is using his as-needed supplemental oxygen almost constantly now.  His dyspnea is associated with both wheezing and coughing that is occasionally productive of blood-tinged sputum; he does say that he has chest soreness only when he coughs.  He has subjective fevers and chills and reports that his appetite has been poor.  He has not had any sick contacts or recent travel.  He denies any lower extremity pain or swelling and has not experienced any palpitations or diaphoresis.      Chart Review:  Patient has not had any recent hospitalizations or outpatient clinic visits within the system.    Past Medical History:   Diagnosis Date    Cancer     Prostate    Glaucoma     H/O prostate cancer     s/p radioactive seed implants    HTN (hypertension)     Pulmonary mycobacterial infection     Renal calculi     Sarcoidosis of lung     Sick sinus syndrome     s/p pacemaker placement       Past  Surgical History:   Procedure Laterality Date    CARDIAC CATHETERIZATION  8-22-13    normal cornary arteries, no pulmonary HTN    CARDIAC PACEMAKER PLACEMENT      EYE SURGERY      heart biopsy      no evidence of sarcoid    prostate seed implants         Review of patient's allergies indicates:   Allergen Reactions    Simvastatin Other (See Comments)     Muscular pain      Valium [diazepam] Palpitations       No current facility-administered medications on file prior to encounter.      Current Outpatient Prescriptions on File Prior to Encounter   Medication Sig    acetaminophen (TYLENOL) 500 MG tablet Take 500 mg by mouth every 6 (six) hours as needed.    albuterol (PROVENTIL) 2.5 mg /3 mL (0.083 %) nebulizer solution Take 2.5 mg by nebulization every 6 (six) hours as needed.    amlodipine (NORVASC) 5 MG tablet Take 5 mg by mouth once daily.    aspirin 81 MG Chew Take 81 mg by mouth once daily.    brimonidine 0.2% (ALPHAGAN) 0.2 % Drop 1 drop every 8 (eight) hours.    latanoprost 0.005 % ophthalmic solution 1 drop every evening.    lisinopril (PRINIVIL,ZESTRIL) 5 MG tablet Take 5 mg by mouth once daily.    metoprolol succinate (TOPROL-XL) 200 MG 24 hr tablet Take 200 mg by mouth once daily.    pravastatin (PRAVACHOL) 80 MG tablet Take 40 mg by mouth once daily.    tacrolimus (PROTOPIC) 0.03 % ointment Apply topically 2 (two) times daily.    multivitamin with minerals tablet Take 1 tablet by mouth once daily.     Family History     Problem Relation (Age of Onset)    Cancer Father    Diabetes Mother    Heart disease Mother        Social History Main Topics    Smoking status: Never Smoker    Smokeless tobacco: Never Used    Alcohol use No    Drug use: No    Sexual activity: Not on file     Review of Systems   Constitutional: Positive for activity change, appetite change, chills, fatigue and fever. Negative for diaphoresis and unexpected weight change.   HENT: Negative.    Eyes: Negative.     Respiratory: Positive for cough, shortness of breath and wheezing. Negative for chest tightness.    Cardiovascular: Negative for chest pain, palpitations and leg swelling.   Gastrointestinal: Negative for abdominal distention, abdominal pain, blood in stool, constipation, diarrhea, nausea and vomiting.   Genitourinary: Negative for dysuria and hematuria.   Musculoskeletal: Negative.    Skin: Negative.    Neurological: Positive for weakness. Negative for dizziness, seizures, syncope and light-headedness.   Psychiatric/Behavioral: Negative.      Objective:     Vital Signs (Most Recent):  Temp: 98.5 °F (36.9 °C) (06/29/18 0436)  Pulse: 90 (06/29/18 0436)  Resp: 19 (06/29/18 0436)  BP: 123/81 (06/29/18 0436)  SpO2: 97 % (06/29/18 0436) Vital Signs (24h Range):  Temp:  [98.5 °F (36.9 °C)-100.1 °F (37.8 °C)] 98.5 °F (36.9 °C)  Pulse:  [] 90  Resp:  [19-32] 19  SpO2:  [76 %-97 %] 97 %  BP: (123-185)/(77-94) 123/81     Weight: 92.9 kg (204 lb 12.9 oz)  Body mass index is 27.78 kg/m².    Physical Exam   Constitutional: He is oriented to person, place, and time. He appears well-developed and well-nourished. No distress.   HENT:   Head: Normocephalic and atraumatic.   Right Ear: External ear normal.   Left Ear: External ear normal.   Nose: Nose normal.   Eyes: Right eye exhibits no discharge. Left eye exhibits no discharge.   Neck: Normal range of motion.   Cardiovascular: Normal rate, regular rhythm, normal heart sounds and intact distal pulses.  Exam reveals no gallop and no friction rub.    No murmur heard.  Pulmonary/Chest:   Mildly increased work of breathing with otherwise clear breath sounds bilaterally   Abdominal: Soft. Bowel sounds are normal. He exhibits no distension. There is no tenderness. There is no rebound and no guarding.   Musculoskeletal: Normal range of motion. He exhibits no edema.   Neurological: He is alert and oriented to person, place, and time.   Skin: Skin is warm and dry. He is not  diaphoretic. No erythema.   Psychiatric: He has a normal mood and affect. His behavior is normal. Judgment and thought content normal.   Nursing note and vitals reviewed.          Significant Labs: All pertinent labs within the past 24 hours have been reviewed.    Significant Imaging: I have reviewed and interpreted all pertinent imaging results/findings within the past 24 hours.    Assessment/Plan:     * COPD with acute exacerbation    Patient has a known history of COPD but also with sarcoidosis.  I have reviewed the chest X-ray and it reveals diffuse pulmonary edema along with small bilateral pleural effusions.  CT-A chest was negative for pulmonary embolism.  He will be started on frequent nebulized WINTER/LAMA; intravenous corticosteroids; and empiric antibiotics.         Elevated troponin    Patient's first two sets of troponins are flat with the first measurement being 0.151 and a repeat of 0.154.  This is likely due to demand.  I have reviewed the EKG and it reveals sinus tachycardia without evidence of acute ischemia.  Will continue to monitor.        Essential hypertension    Patient's blood pressure is better-controlled; will continue home regimen of lisinopril, metoprolol, and valsartan, and provide as-needed clonidine.        Hyperlipidemia    Will continue his home regimen of pravastatin.        COPD (chronic obstructive pulmonary disease)    As addressed above.        Chronic respiratory failure with hypoxia    As addressed above.        Sarcoidosis    As addressed above; will continue his home regimen of prednisone and tacrolimus.         History of sick sinus syndrome    Stable; there are no acute issues.        Presence of permanent cardiac pacemaker    Stable; there are no acute issues.        History of prostate cancer    Stable; there are no acute issues.          VTE Risk Mitigation         Ordered     IP VTE LOW RISK PATIENT  Once      06/28/18 2050           Trevon Brand M.D.  Staff  Dave  Department of Hospital Medicine  [facility]  Pager: [phone]

## 2018-06-29 NOTE — ASSESSMENT & PLAN NOTE
Patient's blood pressure is better-controlled; will continue home regimen of lisinopril, metoprolol, and valsartan, and provide as-needed clonidine.

## 2018-06-30 PROBLEM — J96.21 ACUTE ON CHRONIC RESPIRATORY FAILURE WITH HYPOXIA: Status: ACTIVE | Noted: 2018-06-29

## 2018-06-30 LAB
ANION GAP SERPL CALC-SCNC: 11 MMOL/L
BUN SERPL-MCNC: 22 MG/DL
CALCIUM SERPL-MCNC: 9.8 MG/DL
CHLORIDE SERPL-SCNC: 100 MMOL/L
CO2 SERPL-SCNC: 29 MMOL/L
CREAT SERPL-MCNC: 1.2 MG/DL
EST. GFR  (AFRICAN AMERICAN): >60 ML/MIN/1.73 M^2
EST. GFR  (NON AFRICAN AMERICAN): >60 ML/MIN/1.73 M^2
GLUCOSE SERPL-MCNC: 147 MG/DL
POTASSIUM SERPL-SCNC: 4.6 MMOL/L
SODIUM SERPL-SCNC: 140 MMOL/L

## 2018-06-30 PROCEDURE — 25000003 PHARM REV CODE 250: Performed by: INTERNAL MEDICINE

## 2018-06-30 PROCEDURE — 21400001 HC TELEMETRY ROOM

## 2018-06-30 PROCEDURE — 63600175 PHARM REV CODE 636 W HCPCS: Performed by: INTERNAL MEDICINE

## 2018-06-30 PROCEDURE — 94640 AIRWAY INHALATION TREATMENT: CPT

## 2018-06-30 PROCEDURE — 25000242 PHARM REV CODE 250 ALT 637 W/ HCPCS: Performed by: INTERNAL MEDICINE

## 2018-06-30 PROCEDURE — 36415 COLL VENOUS BLD VENIPUNCTURE: CPT

## 2018-06-30 PROCEDURE — 80048 BASIC METABOLIC PNL TOTAL CA: CPT

## 2018-06-30 RX ORDER — FLUTICASONE FUROATE AND VILANTEROL 200; 25 UG/1; UG/1
1 POWDER RESPIRATORY (INHALATION) DAILY
Status: DISCONTINUED | OUTPATIENT
Start: 2018-06-30 | End: 2018-07-04 | Stop reason: HOSPADM

## 2018-06-30 RX ORDER — TIOTROPIUM BROMIDE 18 UG/1
1 CAPSULE ORAL; RESPIRATORY (INHALATION) DAILY
Status: DISCONTINUED | OUTPATIENT
Start: 2018-06-30 | End: 2018-07-04 | Stop reason: HOSPADM

## 2018-06-30 RX ORDER — IPRATROPIUM BROMIDE AND ALBUTEROL SULFATE 2.5; .5 MG/3ML; MG/3ML
3 SOLUTION RESPIRATORY (INHALATION)
Status: DISCONTINUED | OUTPATIENT
Start: 2018-06-30 | End: 2018-07-04 | Stop reason: HOSPADM

## 2018-06-30 RX ORDER — AZITHROMYCIN 250 MG/1
250 TABLET, FILM COATED ORAL DAILY
Status: DISCONTINUED | OUTPATIENT
Start: 2018-07-01 | End: 2018-07-04 | Stop reason: HOSPADM

## 2018-06-30 RX ADMIN — METHYLPREDNISOLONE SODIUM SUCCINATE 80 MG: 125 INJECTION, POWDER, FOR SOLUTION INTRAMUSCULAR; INTRAVENOUS at 09:06

## 2018-06-30 RX ADMIN — VALSARTAN 20 MG: 40 TABLET, FILM COATED ORAL at 10:06

## 2018-06-30 RX ADMIN — IPRATROPIUM BROMIDE AND ALBUTEROL SULFATE 3 ML: .5; 2.5 SOLUTION RESPIRATORY (INHALATION) at 07:06

## 2018-06-30 RX ADMIN — GUAIFENESIN 600 MG: 600 TABLET, EXTENDED RELEASE ORAL at 09:06

## 2018-06-30 RX ADMIN — IPRATROPIUM BROMIDE AND ALBUTEROL SULFATE 3 ML: .5; 2.5 SOLUTION RESPIRATORY (INHALATION) at 08:06

## 2018-06-30 RX ADMIN — CEFTRIAXONE 1 G: 1 INJECTION, SOLUTION INTRAVENOUS at 09:06

## 2018-06-30 RX ADMIN — IPRATROPIUM BROMIDE AND ALBUTEROL SULFATE 3 ML: .5; 2.5 SOLUTION RESPIRATORY (INHALATION) at 03:06

## 2018-06-30 RX ADMIN — TIOTROPIUM BROMIDE 18 MCG: 18 CAPSULE ORAL; RESPIRATORY (INHALATION) at 11:06

## 2018-06-30 RX ADMIN — METOPROLOL SUCCINATE 100 MG: 50 TABLET, EXTENDED RELEASE ORAL at 09:06

## 2018-06-30 RX ADMIN — ASPIRIN 81 MG 81 MG: 81 TABLET ORAL at 09:06

## 2018-06-30 RX ADMIN — AZITHROMYCIN MONOHYDRATE 500 MG: 500 INJECTION, POWDER, LYOPHILIZED, FOR SOLUTION INTRAVENOUS at 05:06

## 2018-06-30 RX ADMIN — AMLODIPINE BESYLATE 5 MG: 5 TABLET ORAL at 09:06

## 2018-06-30 RX ADMIN — PRAVASTATIN SODIUM 40 MG: 40 TABLET ORAL at 09:06

## 2018-06-30 RX ADMIN — METHYLPREDNISOLONE SODIUM SUCCINATE 80 MG: 125 INJECTION, POWDER, FOR SOLUTION INTRAMUSCULAR; INTRAVENOUS at 05:06

## 2018-06-30 RX ADMIN — PANTOPRAZOLE SODIUM 40 MG: 40 TABLET, DELAYED RELEASE ORAL at 09:06

## 2018-06-30 RX ADMIN — FLUTICASONE FUROATE AND VILANTEROL TRIFENATATE 1 PUFF: 200; 25 POWDER RESPIRATORY (INHALATION) at 11:06

## 2018-06-30 RX ADMIN — METHYLPREDNISOLONE SODIUM SUCCINATE 80 MG: 125 INJECTION, POWDER, FOR SOLUTION INTRAMUSCULAR; INTRAVENOUS at 03:06

## 2018-06-30 NOTE — NURSING
Received bedside report, in bed AAOx3,. O2 at 3L/min via NC. Bed low and locked call bell in reach. Instructed to call with needs and not to get up without assistance. Verbalized understanding.

## 2018-06-30 NOTE — ASSESSMENT & PLAN NOTE
Patient has a known history of COPD but also with sarcoidosis. CTA without evidence of central PE but subsegmental and distal could not be excluded. Also with small bilateral pleural effusions, diffuse ground glass opacities and cavitary lesions with mediastinal lymphadenopathy. Given known history of sarcoidosis, these are likely chronic, however I need records [facility] to confirm (requested). Has shown improvement within first 24 hours with use of IV steroids, bronchodilators and empiric azithromycin, therefore PE unlikely but still part of differential. I will start maintenance LABA/ICS and inhl anticholinergic, continue with duo-neb treatments and add ceftriaxone. Supplemental O2 as needed for goal sats 88-93%.

## 2018-06-30 NOTE — SUBJECTIVE & OBJECTIVE
Interval History: feeling better but still with some dyspnea with minimal exertion. Chest pain free. Renal function better.     Review of Systems   Constitutional: Negative for activity change, appetite change, chills, diaphoresis, fatigue, fever and unexpected weight change.   HENT: Negative.    Eyes: Negative.    Respiratory: Positive for cough and shortness of breath. Negative for chest tightness and wheezing.    Cardiovascular: Negative for chest pain, palpitations and leg swelling.   Gastrointestinal: Negative for abdominal distention, abdominal pain, blood in stool, constipation, diarrhea, nausea and vomiting.   Genitourinary: Negative for dysuria and hematuria.   Musculoskeletal: Negative.    Skin: Negative.    Neurological: Negative for dizziness, seizures, syncope, weakness and light-headedness.   Psychiatric/Behavioral: Negative.      Objective:     Vital Signs (Most Recent):  Temp: 96.9 °F (36.1 °C) (06/30/18 1142)  Pulse: 78 (06/30/18 1506)  Resp: 18 (06/30/18 1506)  BP: 133/83 (06/30/18 1142)  SpO2: 98 % (06/30/18 1506) Vital Signs (24h Range):  Temp:  [96.9 °F (36.1 °C)-97.9 °F (36.6 °C)] 96.9 °F (36.1 °C)  Pulse:  [78-92] 78  Resp:  [18-22] 18  SpO2:  [88 %-98 %] 98 %  BP: (124-143)/(78-86) 133/83     Weight: 93 kg (205 lb 0.4 oz)  Body mass index is 27.81 kg/m².    Intake/Output Summary (Last 24 hours) at 06/30/18 1636  Last data filed at 06/30/18 0000   Gross per 24 hour   Intake              240 ml   Output             1000 ml   Net             -760 ml      Physical Exam   Constitutional: He is oriented to person, place, and time. He appears well-developed and well-nourished. No distress.   HENT:   Head: Normocephalic and atraumatic.   Right Ear: External ear normal.   Left Ear: External ear normal.   Nose: Nose normal.   Eyes: Right eye exhibits no discharge. Left eye exhibits no discharge.   Neck: Normal range of motion.   Cardiovascular: Normal rate, regular rhythm, normal heart sounds and  intact distal pulses.  Exam reveals no gallop and no friction rub.    No murmur heard.  Pulmonary/Chest: Effort normal and breath sounds normal. He has no wheezes. He has no rales.   Abdominal: Soft. Bowel sounds are normal. He exhibits no distension. There is no tenderness. There is no rebound and no guarding.   Musculoskeletal: Normal range of motion. He exhibits no edema.   Neurological: He is alert and oriented to person, place, and time.   Skin: Skin is warm and dry. He is not diaphoretic. No erythema.   Psychiatric: He has a normal mood and affect. His behavior is normal. Judgment and thought content normal.   Nursing note and vitals reviewed.      Significant Labs: All pertinent labs within the past 24 hours have been reviewed.    Significant Imaging: I have reviewed all pertinent imaging results/findings within the past 24 hours.  I have reviewed and interpreted all pertinent imaging results/findings within the past 24 hours.

## 2018-06-30 NOTE — ASSESSMENT & PLAN NOTE
Patient's blood pressure is better-controlled; will continue home regimen of  metoprolol, and valsartan, and provide as-needed clonidine.

## 2018-06-30 NOTE — PROGRESS NOTES
[facility] Hospital Medicine  Progress Note    Patient Name: Levi Melendez  MRN: [id number]  Patient Class: IP- Inpatient   Admission Date: 6/28/2018  Length of Stay: 2 days  Attending Physician: Nevaeh Caldera MD  Primary Care Provider: Tasha Brown MD        Subjective:     Principal Problem:COPD with acute exacerbation    HPI:  Mr. Levi Melendez is a 68 y.o. male with essential hypertension, hyperlipidemia (LDL unknown), COPD, chronic respiratory failure with hypoxia, sarcoidosis, history of sick sinus syndrome with permanent pacemaker in place, and history of prostate cancer who presents to [facility] ED with complaints of progressively worsening dyspnea for three weeks.  He reports that initially the shortness of breath would limit his mobility to one flight of stairs but it has progressively worsened to the point in the last couple of days that just maoing around in bed makes him dyspneic.  He feels fine at rest but does say that he is using his as-needed supplemental oxygen almost constantly now.  His dyspnea is associated with both wheezing and coughing that is occasionally productive of blood-tinged sputum; he does say that he has chest soreness only when he coughs.  He has subjective fevers and chills and reports that his appetite has been poor.  He has not had any sick contacts or recent travel.  He denies any lower extremity pain or swelling and has not experienced any palpitations or diaphoresis.      Hospital Course:  No notes on file    Interval History: feeling better but still with some dyspnea with minimal exertion. Chest pain free. Renal function better.     Review of Systems   Constitutional: Negative for activity change, appetite change, chills, diaphoresis, fatigue, fever and unexpected weight change.   HENT: Negative.    Eyes: Negative.    Respiratory: Positive for cough and shortness of breath. Negative for chest tightness and wheezing.    Cardiovascular: Negative for  chest pain, palpitations and leg swelling.   Gastrointestinal: Negative for abdominal distention, abdominal pain, blood in stool, constipation, diarrhea, nausea and vomiting.   Genitourinary: Negative for dysuria and hematuria.   Musculoskeletal: Negative.    Skin: Negative.    Neurological: Negative for dizziness, seizures, syncope, weakness and light-headedness.   Psychiatric/Behavioral: Negative.      Objective:     Vital Signs (Most Recent):  Temp: 96.9 °F (36.1 °C) (06/30/18 1142)  Pulse: 78 (06/30/18 1506)  Resp: 18 (06/30/18 1506)  BP: 133/83 (06/30/18 1142)  SpO2: 98 % (06/30/18 1506) Vital Signs (24h Range):  Temp:  [96.9 °F (36.1 °C)-97.9 °F (36.6 °C)] 96.9 °F (36.1 °C)  Pulse:  [78-92] 78  Resp:  [18-22] 18  SpO2:  [88 %-98 %] 98 %  BP: (124-143)/(78-86) 133/83     Weight: 93 kg (205 lb 0.4 oz)  Body mass index is 27.81 kg/m².    Intake/Output Summary (Last 24 hours) at 06/30/18 1636  Last data filed at 06/30/18 0000   Gross per 24 hour   Intake              240 ml   Output             1000 ml   Net             -760 ml      Physical Exam   Constitutional: He is oriented to person, place, and time. He appears well-developed and well-nourished. No distress.   HENT:   Head: Normocephalic and atraumatic.   Right Ear: External ear normal.   Left Ear: External ear normal.   Nose: Nose normal.   Eyes: Right eye exhibits no discharge. Left eye exhibits no discharge.   Neck: Normal range of motion.   Cardiovascular: Normal rate, regular rhythm, normal heart sounds and intact distal pulses.  Exam reveals no gallop and no friction rub.    No murmur heard.  Pulmonary/Chest: Effort normal and breath sounds normal. He has no wheezes. He has no rales.   Abdominal: Soft. Bowel sounds are normal. He exhibits no distension. There is no tenderness. There is no rebound and no guarding.   Musculoskeletal: Normal range of motion. He exhibits no edema.   Neurological: He is alert and oriented to person, place, and time.   Skin:  Skin is warm and dry. He is not diaphoretic. No erythema.   Psychiatric: He has a normal mood and affect. His behavior is normal. Judgment and thought content normal.   Nursing note and vitals reviewed.      Significant Labs: All pertinent labs within the past 24 hours have been reviewed.    Significant Imaging: I have reviewed all pertinent imaging results/findings within the past 24 hours.  I have reviewed and interpreted all pertinent imaging results/findings within the past 24 hours.    Assessment/Plan:      * COPD with acute exacerbation    Patient has a known history of COPD but also with sarcoidosis. CTA without evidence of central PE but subsegmental and distal could not be excluded. Also with small bilateral pleural effusions, diffuse ground glass opacities and cavitary lesions with mediastinal lymphadenopathy. Given known history of sarcoidosis, these are likely chronic, however I need [facility] to confirm (requested). Has shown improvement within first 24 hours with use of IV steroids, bronchodilators and empiric azithromycin, therefore PE unlikely but still part of differential. I will start maintenance LABA/ICS and inhl anticholinergic, continue with duo-neb treatments and add ceftriaxone. Supplemental O2 as needed for goal sats 88-93%.        Acute on chronic respiratory failure with hypoxia    As addressed above.        History of prostate cancer    Stable; there are no acute issues.        Presence of permanent cardiac pacemaker    Stable; there are no acute issues.        History of sick sinus syndrome    Stable; there are no acute issues.        COPD (chronic obstructive pulmonary disease)    As addressed above.        Hyperlipidemia    Patient is not on pravastatin        Essential hypertension    Patient's blood pressure is better-controlled; will continue home regimen of  metoprolol, and valsartan, and provide as-needed clonidine.        Elevated troponin    Patient's first two sets of  troponins are flat with the first measurement being 0.151 and a repeat of 0.154.  This is likely due to demand.  I have reviewed the EKG and it reveals sinus tachycardia without evidence of acute ischemia.         Sarcoidosis    As addressed above; will continue his home regimen of prednisone           VTE Risk Mitigation         Ordered     IP VTE LOW RISK PATIENT  Once      06/28/18 2050              Nevaeh Tucker MD  Department of Hospital Medicine   [facility]

## 2018-06-30 NOTE — ASSESSMENT & PLAN NOTE
Patient's first two sets of troponins are flat with the first measurement being 0.151 and a repeat of 0.154.  This is likely due to demand.  I have reviewed the EKG and it reveals sinus tachycardia without evidence of acute ischemia.

## 2018-07-01 PROCEDURE — 25000242 PHARM REV CODE 250 ALT 637 W/ HCPCS: Performed by: INTERNAL MEDICINE

## 2018-07-01 PROCEDURE — 63600175 PHARM REV CODE 636 W HCPCS: Performed by: INTERNAL MEDICINE

## 2018-07-01 PROCEDURE — 27000190 HC CPAP FULL FACE MASK W/VALVE

## 2018-07-01 PROCEDURE — 94640 AIRWAY INHALATION TREATMENT: CPT

## 2018-07-01 PROCEDURE — 21400001 HC TELEMETRY ROOM

## 2018-07-01 PROCEDURE — 25000003 PHARM REV CODE 250: Performed by: INTERNAL MEDICINE

## 2018-07-01 PROCEDURE — 94660 CPAP INITIATION&MGMT: CPT

## 2018-07-01 PROCEDURE — 99900035 HC TECH TIME PER 15 MIN (STAT)

## 2018-07-01 PROCEDURE — 27000221 HC OXYGEN, UP TO 24 HOURS

## 2018-07-01 RX ADMIN — GUAIFENESIN 600 MG: 600 TABLET, EXTENDED RELEASE ORAL at 09:07

## 2018-07-01 RX ADMIN — TIOTROPIUM BROMIDE 18 MCG: 18 CAPSULE ORAL; RESPIRATORY (INHALATION) at 07:07

## 2018-07-01 RX ADMIN — AZITHROMYCIN MONOHYDRATE 250 MG: 250 TABLET ORAL at 09:07

## 2018-07-01 RX ADMIN — CEFTRIAXONE 1 G: 1 INJECTION, SOLUTION INTRAVENOUS at 09:07

## 2018-07-01 RX ADMIN — VALSARTAN 20 MG: 40 TABLET, FILM COATED ORAL at 09:07

## 2018-07-01 RX ADMIN — METHYLPREDNISOLONE SODIUM SUCCINATE 80 MG: 125 INJECTION, POWDER, FOR SOLUTION INTRAMUSCULAR; INTRAVENOUS at 05:07

## 2018-07-01 RX ADMIN — ASPIRIN 81 MG 81 MG: 81 TABLET ORAL at 09:07

## 2018-07-01 RX ADMIN — IPRATROPIUM BROMIDE AND ALBUTEROL SULFATE 3 ML: .5; 2.5 SOLUTION RESPIRATORY (INHALATION) at 07:07

## 2018-07-01 RX ADMIN — FLUTICASONE FUROATE AND VILANTEROL TRIFENATATE 1 PUFF: 200; 25 POWDER RESPIRATORY (INHALATION) at 07:07

## 2018-07-01 RX ADMIN — METHYLPREDNISOLONE SODIUM SUCCINATE 40 MG: 40 INJECTION, POWDER, FOR SOLUTION INTRAMUSCULAR; INTRAVENOUS at 03:07

## 2018-07-01 RX ADMIN — PANTOPRAZOLE SODIUM 40 MG: 40 TABLET, DELAYED RELEASE ORAL at 09:07

## 2018-07-01 RX ADMIN — METOPROLOL SUCCINATE 100 MG: 50 TABLET, EXTENDED RELEASE ORAL at 09:07

## 2018-07-01 RX ADMIN — METHYLPREDNISOLONE SODIUM SUCCINATE 40 MG: 40 INJECTION, POWDER, FOR SOLUTION INTRAMUSCULAR; INTRAVENOUS at 10:07

## 2018-07-01 RX ADMIN — IPRATROPIUM BROMIDE AND ALBUTEROL SULFATE 3 ML: .5; 2.5 SOLUTION RESPIRATORY (INHALATION) at 01:07

## 2018-07-01 RX ADMIN — IPRATROPIUM BROMIDE AND ALBUTEROL SULFATE 3 ML: .5; 2.5 SOLUTION RESPIRATORY (INHALATION) at 08:07

## 2018-07-01 NOTE — ASSESSMENT & PLAN NOTE
Patient has a known history of COPD but also with sarcoidosis. CTA without evidence of central PE but subsegmental and distal could not be excluded. Also with small bilateral pleural effusions, diffuse ground glass opacities and cavitary lesions with mediastinal lymphadenopathy. Given known history of sarcoidosis, these are likely chronic, however I need records [facility] to confirm (requested). Hold off on CPAP as it caused negative effect on patient's symptoms. Continue IV steroids (decrease to 40 TID), bronchodilators and empiric azithromycin + ceftriaxone, maintenance LABA/ICS and inhl anticholinergic, continue with duo-neb treatments. Supplemental O2 as needed for goal sats 88-93%.

## 2018-07-01 NOTE — SUBJECTIVE & OBJECTIVE
Interval History: after CPAP placed, patient experienced productive cough with dark blood and worsening SOB which has resolved by now. Able to stay at room air briefly this AM which is improvement.     Review of Systems   Constitutional: Negative for activity change, appetite change, chills, diaphoresis, fatigue, fever and unexpected weight change.   HENT: Negative.    Eyes: Negative.    Respiratory: Positive for cough and shortness of breath. Negative for chest tightness and wheezing.    Cardiovascular: Negative for chest pain, palpitations and leg swelling.   Gastrointestinal: Negative for abdominal distention, abdominal pain, blood in stool, constipation, diarrhea, nausea and vomiting.   Genitourinary: Negative for dysuria and hematuria.   Musculoskeletal: Negative.    Skin: Negative.    Neurological: Negative for dizziness, seizures, syncope, weakness and light-headedness.   Psychiatric/Behavioral: Negative.      Objective:     Vital Signs (Most Recent):  Temp: 96.5 °F (35.8 °C) (07/01/18 0732)  Pulse: 88 (07/01/18 0754)  Resp: 19 (07/01/18 0754)  BP: 138/82 (07/01/18 0732)  SpO2: 97 % (07/01/18 0732) Vital Signs (24h Range):  Temp:  [96.5 °F (35.8 °C)-98.2 °F (36.8 °C)] 96.5 °F (35.8 °C)  Pulse:  [77-94] 88  Resp:  [16-20] 19  SpO2:  [93 %-98 %] 97 %  BP: (115-138)/(69-84) 138/82     Weight: 93 kg (205 lb 0.4 oz)  Body mass index is 27.81 kg/m².  No intake or output data in the 24 hours ending 07/01/18 0845   Physical Exam   Constitutional: He is oriented to person, place, and time. He appears well-developed and well-nourished. No distress.   HENT:   Head: Normocephalic and atraumatic.   Right Ear: External ear normal.   Left Ear: External ear normal.   Nose: Nose normal.   Eyes: Right eye exhibits no discharge. Left eye exhibits no discharge.   Neck: Normal range of motion.   Cardiovascular: Normal rate, regular rhythm, normal heart sounds and intact distal pulses.  Exam reveals no gallop and no friction rub.     No murmur heard.  Pulmonary/Chest: Effort normal and breath sounds normal. He has no wheezes. He has no rales.   Abdominal: Soft. Bowel sounds are normal. He exhibits no distension. There is no tenderness. There is no rebound and no guarding.   Musculoskeletal: Normal range of motion. He exhibits no edema.   Neurological: He is alert and oriented to person, place, and time.   Skin: Skin is warm and dry. He is not diaphoretic. No erythema.   Psychiatric: He has a normal mood and affect. His behavior is normal. Judgment and thought content normal.   Nursing note and vitals reviewed.      Significant Labs: All pertinent labs within the past 24 hours have been reviewed.    Significant Imaging: I have reviewed all pertinent imaging results/findings within the past 24 hours.  I have reviewed and interpreted all pertinent imaging results/findings within the past 24 hours.

## 2018-07-01 NOTE — NURSING
Bedside report completed with Loreto Moctezuma LPN, in bed O2 at 3L/min via NC, tele monitoring ongoing.. Resp e/u, denies any pain or discomfort. Urinal at bedside, voices wished for C-PAP tonight. Offgoing nurse page MD-will f/u. Call bell in reach. Bed low and locked. Stable.

## 2018-07-01 NOTE — PROGRESS NOTES
[facility] Hospital Medicine  Progress Note    Patient Name: Levi Melendez  MRN: [id number]  Patient Class: IP- Inpatient   Admission Date: 6/28/2018  Length of Stay: 3 days  Attending Physician: Nevaeh Caldera MD  Primary Care Provider: Tasha Brown MD        Subjective:     Principal Problem:COPD with acute exacerbation    HPI:  Mr. Levi Melendez is a 68 y.o. male with essential hypertension, hyperlipidemia (LDL unknown), COPD, chronic respiratory failure with hypoxia, sarcoidosis, history of sick sinus syndrome with permanent pacemaker in place, and history of prostate cancer who presents to [facility] ED with complaints of progressively worsening dyspnea for three weeks.  He reports that initially the shortness of breath would limit his mobility to one flight of stairs but it has progressively worsened to the point in the last couple of days that just maoing around in bed makes him dyspneic.  He feels fine at rest but does say that he is using his as-needed supplemental oxygen almost constantly now.  His dyspnea is associated with both wheezing and coughing that is occasionally productive of blood-tinged sputum; he does say that he has chest soreness only when he coughs.  He has subjective fevers and chills and reports that his appetite has been poor.  He has not had any sick contacts or recent travel.  He denies any lower extremity pain or swelling and has not experienced any palpitations or diaphoresis.      Hospital Course:  No notes on file    Interval History: after CPAP placed, patient experienced productive cough with dark blood and worsening SOB which has resolved by now. Able to stay at room air briefly this AM which is improvement.     Review of Systems   Constitutional: Negative for activity change, appetite change, chills, diaphoresis, fatigue, fever and unexpected weight change.   HENT: Negative.    Eyes: Negative.    Respiratory: Positive for cough and shortness of breath.  Negative for chest tightness and wheezing.    Cardiovascular: Negative for chest pain, palpitations and leg swelling.   Gastrointestinal: Negative for abdominal distention, abdominal pain, blood in stool, constipation, diarrhea, nausea and vomiting.   Genitourinary: Negative for dysuria and hematuria.   Musculoskeletal: Negative.    Skin: Negative.    Neurological: Negative for dizziness, seizures, syncope, weakness and light-headedness.   Psychiatric/Behavioral: Negative.      Objective:     Vital Signs (Most Recent):  Temp: 96.5 °F (35.8 °C) (07/01/18 0732)  Pulse: 88 (07/01/18 0754)  Resp: 19 (07/01/18 0754)  BP: 138/82 (07/01/18 0732)  SpO2: 97 % (07/01/18 0732) Vital Signs (24h Range):  Temp:  [96.5 °F (35.8 °C)-98.2 °F (36.8 °C)] 96.5 °F (35.8 °C)  Pulse:  [77-94] 88  Resp:  [16-20] 19  SpO2:  [93 %-98 %] 97 %  BP: (115-138)/(69-84) 138/82     Weight: 93 kg (205 lb 0.4 oz)  Body mass index is 27.81 kg/m².  No intake or output data in the 24 hours ending 07/01/18 0845   Physical Exam   Constitutional: He is oriented to person, place, and time. He appears well-developed and well-nourished. No distress.   HENT:   Head: Normocephalic and atraumatic.   Right Ear: External ear normal.   Left Ear: External ear normal.   Nose: Nose normal.   Eyes: Right eye exhibits no discharge. Left eye exhibits no discharge.   Neck: Normal range of motion.   Cardiovascular: Normal rate, regular rhythm, normal heart sounds and intact distal pulses.  Exam reveals no gallop and no friction rub.    No murmur heard.  Pulmonary/Chest: Effort normal and breath sounds normal. He has no wheezes. He has no rales.   Abdominal: Soft. Bowel sounds are normal. He exhibits no distension. There is no tenderness. There is no rebound and no guarding.   Musculoskeletal: Normal range of motion. He exhibits no edema.   Neurological: He is alert and oriented to person, place, and time.   Skin: Skin is warm and dry. He is not diaphoretic. No erythema.    Psychiatric: He has a normal mood and affect. His behavior is normal. Judgment and thought content normal.   Nursing note and vitals reviewed.      Significant Labs: All pertinent labs within the past 24 hours have been reviewed.    Significant Imaging: I have reviewed all pertinent imaging results/findings within the past 24 hours.  I have reviewed and interpreted all pertinent imaging results/findings within the past 24 hours.    Assessment/Plan:      * COPD with acute exacerbation    Patient has a known history of COPD but also with sarcoidosis. CTA without evidence of central PE but subsegmental and distal could not be excluded. Also with small bilateral pleural effusions, diffuse ground glass opacities and cavitary lesions with mediastinal lymphadenopathy. Given known history of sarcoidosis, these are likely chronic, however I need [facility] to confirm (requested). Hold off on CPAP as it caused negative effect on patient's symptoms. Continue IV steroids (decrease to 40 TID), bronchodilators and empiric azithromycin + ceftriaxone, maintenance LABA/ICS and inhl anticholinergic, continue with duo-neb treatments. Supplemental O2 as needed for goal sats 88-93%.        Acute on chronic respiratory failure with hypoxia    As addressed above.        History of prostate cancer    Stable; there are no acute issues.        Presence of permanent cardiac pacemaker    Stable; there are no acute issues.        History of sick sinus syndrome    Stable; there are no acute issues.        COPD (chronic obstructive pulmonary disease)    As addressed above.        Hyperlipidemia    Patient is not on pravastatin        Essential hypertension    Patient's blood pressure is better-controlled; will continue home regimen of  metoprolol, and valsartan, and provide as-needed clonidine.        Elevated troponin    Patient's first two sets of troponins are flat with the first measurement being 0.151 and a repeat of 0.154.  This is  likely due to demand.  I have reviewed the EKG and it reveals sinus tachycardia without evidence of acute ischemia.         Sarcoidosis    As addressed above; will continue his home regimen of prednisone           VTE Risk Mitigation         Ordered     IP VTE LOW RISK PATIENT  Once      06/28/18 2050          PT eval today for right leg pain and exercise capacity.     Nevaeh Tucker MD  Department of Hospital Medicine   [facility]

## 2018-07-02 LAB
ANION GAP SERPL CALC-SCNC: 14 MMOL/L
BUN SERPL-MCNC: 26 MG/DL
CALCIUM SERPL-MCNC: 9.2 MG/DL
CHLORIDE SERPL-SCNC: 104 MMOL/L
CO2 SERPL-SCNC: 19 MMOL/L
CREAT SERPL-MCNC: 1.1 MG/DL
EST. GFR  (AFRICAN AMERICAN): >60 ML/MIN/1.73 M^2
EST. GFR  (NON AFRICAN AMERICAN): >60 ML/MIN/1.73 M^2
GLUCOSE SERPL-MCNC: 139 MG/DL
POTASSIUM SERPL-SCNC: 5.5 MMOL/L
SODIUM SERPL-SCNC: 137 MMOL/L

## 2018-07-02 PROCEDURE — 25000242 PHARM REV CODE 250 ALT 637 W/ HCPCS: Performed by: INTERNAL MEDICINE

## 2018-07-02 PROCEDURE — 25000003 PHARM REV CODE 250: Performed by: INTERNAL MEDICINE

## 2018-07-02 PROCEDURE — G8980 MOBILITY D/C STATUS: HCPCS | Mod: CJ

## 2018-07-02 PROCEDURE — 97161 PT EVAL LOW COMPLEX 20 MIN: CPT

## 2018-07-02 PROCEDURE — 94640 AIRWAY INHALATION TREATMENT: CPT

## 2018-07-02 PROCEDURE — 27000221 HC OXYGEN, UP TO 24 HOURS

## 2018-07-02 PROCEDURE — G8978 MOBILITY CURRENT STATUS: HCPCS | Mod: CJ

## 2018-07-02 PROCEDURE — G8979 MOBILITY GOAL STATUS: HCPCS | Mod: CI

## 2018-07-02 PROCEDURE — 80048 BASIC METABOLIC PNL TOTAL CA: CPT

## 2018-07-02 PROCEDURE — 94761 N-INVAS EAR/PLS OXIMETRY MLT: CPT

## 2018-07-02 PROCEDURE — 63600175 PHARM REV CODE 636 W HCPCS: Performed by: INTERNAL MEDICINE

## 2018-07-02 PROCEDURE — 21400001 HC TELEMETRY ROOM

## 2018-07-02 PROCEDURE — 36415 COLL VENOUS BLD VENIPUNCTURE: CPT

## 2018-07-02 RX ORDER — GUAIFENESIN 100 MG/5ML
200 SOLUTION ORAL EVERY 6 HOURS
Status: COMPLETED | OUTPATIENT
Start: 2018-07-02 | End: 2018-07-04

## 2018-07-02 RX ORDER — PREDNISONE 20 MG/1
40 TABLET ORAL DAILY
Status: DISCONTINUED | OUTPATIENT
Start: 2018-07-03 | End: 2018-07-04 | Stop reason: HOSPADM

## 2018-07-02 RX ADMIN — IPRATROPIUM BROMIDE AND ALBUTEROL SULFATE 3 ML: .5; 2.5 SOLUTION RESPIRATORY (INHALATION) at 01:07

## 2018-07-02 RX ADMIN — AZITHROMYCIN MONOHYDRATE 250 MG: 250 TABLET ORAL at 08:07

## 2018-07-02 RX ADMIN — FLUTICASONE FUROATE AND VILANTEROL TRIFENATATE 1 PUFF: 200; 25 POWDER RESPIRATORY (INHALATION) at 08:07

## 2018-07-02 RX ADMIN — VALSARTAN 20 MG: 40 TABLET, FILM COATED ORAL at 09:07

## 2018-07-02 RX ADMIN — IPRATROPIUM BROMIDE AND ALBUTEROL SULFATE 3 ML: .5; 2.5 SOLUTION RESPIRATORY (INHALATION) at 07:07

## 2018-07-02 RX ADMIN — PANTOPRAZOLE SODIUM 40 MG: 40 TABLET, DELAYED RELEASE ORAL at 08:07

## 2018-07-02 RX ADMIN — ASPIRIN 81 MG 81 MG: 81 TABLET ORAL at 09:07

## 2018-07-02 RX ADMIN — VALSARTAN 20 MG: 40 TABLET, FILM COATED ORAL at 08:07

## 2018-07-02 RX ADMIN — GUAIFENESIN 600 MG: 600 TABLET, EXTENDED RELEASE ORAL at 09:07

## 2018-07-02 RX ADMIN — GUAIFENESIN 200 MG: 200 SOLUTION ORAL at 06:07

## 2018-07-02 RX ADMIN — CEFTRIAXONE 1 G: 1 INJECTION, SOLUTION INTRAVENOUS at 08:07

## 2018-07-02 RX ADMIN — METOPROLOL SUCCINATE 100 MG: 50 TABLET, EXTENDED RELEASE ORAL at 08:07

## 2018-07-02 RX ADMIN — IPRATROPIUM BROMIDE AND ALBUTEROL SULFATE 3 ML: .5; 2.5 SOLUTION RESPIRATORY (INHALATION) at 08:07

## 2018-07-02 RX ADMIN — TIOTROPIUM BROMIDE 18 MCG: 18 CAPSULE ORAL; RESPIRATORY (INHALATION) at 08:07

## 2018-07-02 NOTE — SUBJECTIVE & OBJECTIVE
Interval History: feels better today. No more blood in sputum. Was able to participate with PT. Although with SOB, it was not as bad as when he first came in.     Review of Systems   Constitutional: Negative for activity change, appetite change, chills, diaphoresis, fatigue, fever and unexpected weight change.   HENT: Negative.    Eyes: Negative.    Respiratory: Positive for shortness of breath. Negative for cough, chest tightness and wheezing.    Cardiovascular: Negative for chest pain, palpitations and leg swelling.   Gastrointestinal: Negative for abdominal distention, abdominal pain, blood in stool, constipation, diarrhea, nausea and vomiting.   Genitourinary: Negative for dysuria and hematuria.   Musculoskeletal: Negative.    Skin: Negative.    Neurological: Negative for dizziness, seizures, syncope, weakness and light-headedness.   Psychiatric/Behavioral: Negative.      Objective:     Vital Signs (Most Recent):  Temp: 97.8 °F (36.6 °C) (07/02/18 1100)  Pulse: 77 (07/02/18 1301)  Resp: 18 (07/02/18 1301)  BP: 128/76 (07/02/18 1100)  SpO2: 97 % (07/02/18 1301) Vital Signs (24h Range):  Temp:  [97.5 °F (36.4 °C)-97.8 °F (36.6 °C)] 97.8 °F (36.6 °C)  Pulse:  [] 77  Resp:  [16-20] 18  SpO2:  [92 %-100 %] 97 %  BP: (128-146)/(76-83) 128/76     Weight: 96.6 kg (212 lb 15.4 oz)  Body mass index is 28.88 kg/m².    Intake/Output Summary (Last 24 hours) at 07/02/18 1416  Last data filed at 07/01/18 1730   Gross per 24 hour   Intake              240 ml   Output                0 ml   Net              240 ml      Physical Exam   Constitutional: He is oriented to person, place, and time. He appears well-developed and well-nourished. No distress.   HENT:   Head: Normocephalic and atraumatic.   Right Ear: External ear normal.   Left Ear: External ear normal.   Nose: Nose normal.   Eyes: Right eye exhibits no discharge. Left eye exhibits no discharge.   Neck: Normal range of motion.   Cardiovascular: Normal rate, regular  rhythm, normal heart sounds and intact distal pulses.  Exam reveals no gallop and no friction rub.    No murmur heard.  Pulmonary/Chest: Effort normal and breath sounds normal. He has no wheezes. He has no rales.   Abdominal: Soft. Bowel sounds are normal. He exhibits no distension. There is no tenderness. There is no rebound and no guarding.   Musculoskeletal: Normal range of motion. He exhibits no edema.   Neurological: He is alert and oriented to person, place, and time.   Skin: Skin is warm and dry. He is not diaphoretic. No erythema.   Psychiatric: He has a normal mood and affect. His behavior is normal. Judgment and thought content normal.   Nursing note and vitals reviewed.      Significant Labs: All pertinent labs within the past 24 hours have been reviewed.    Significant Imaging: I have reviewed all pertinent imaging results/findings within the past 24 hours.  I have reviewed and interpreted all pertinent imaging results/findings within the past 24 hours.

## 2018-07-02 NOTE — HOSPITAL COURSE
Mr Nora presented with acute on chronic respiratory failure 2/2 COPD exacerbation. No clear etiology. CTA negative for central pulmonary embolism and small bilateral pleural effusions but with diffuse groundglass airspace opacities. Patient clinically improved with supplemental O2, solumedrol, empiric azithro/ceftriaxone and bronchodilators. He does have underlying sarcoidosis and CT also showed cavitary lesions in upper lobes with scarring and mediastinal lymphadenopathy, which can be seen with sarcoidosis, TB and cancer. He reports to me he has been worked up for TB and was negative. [facility] from [facility] and quant gold ordered (has had positive PPD in the past (with negative quant)). Patient clinically improved over the course of several days. Will d/c to home today. Slow steroid taper then resume his 5mg daily. Activity as tolerated. Diet- low NA. Follow up with PCP in one week

## 2018-07-02 NOTE — PLAN OF CARE
07/02/18 1207   Discharge Reassessment   Assessment Type Discharge Planning Reassessment   Provided patient/caregiver education on the expected discharge date and the discharge plan (pt in process of moving from [place] to [facility])   Do you have any problems affording any of your prescribed medications? No   Discharge Plan A Home with family  (with [facility])   Discharge Plan B Home with family;Home Health  (with [facility])   Patient choice form signed by patient/caregiver N/A   Can the patient answer the patient profile reliably? Yes, cognitively intact   How does the patient rate their overall health at the present time? Fair   Describe the patient's ability to walk at the present time. Minor restrictions or changes   How often would a person be available to care for the patient? Often   Number of comorbid conditions (as recorded on the chart) Four   During the past month, has the patient often been bothered by feeling down, depressed or hopeless? No   During the past month, has the patient often been bothered by little interest or pleasure in doing things? No   recommendations from PT/OT is for pt to discharge to home with home health and rolling walker.  Pt in agreement with this plan at this time

## 2018-07-02 NOTE — PT/OT/SLP EVAL
Physical Therapy Evaluation    Patient Name:  Levi Melendez   MRN:  [id number]    Recommendations:     Discharge Recommendations:  home health PT   Discharge Equipment Recommendations: walker, rolling   Barriers to discharge: None    Assessment:     Levi Melendez is a 68 y.o. male admitted with a medical diagnosis of COPD with acute exacerbation. He presents with the following impairments/functional limitations:  weakness, impaired endurance, impaired functional mobilty, gait instability, impaired balance, decreased lower extremity function, pain, impaired cardiopulmonary response to activity.    Rehab Prognosis:  fair; patient would benefit from acute skilled PT services to address these deficits and reach maximum level of function.      Recent Surgery: * No surgery found *      Plan:     During this hospitalization, patient to be seen 5 x/week to address the above listed problems via gait training, therapeutic activities, therapeutic exercises  · Plan of Care Expires:  07/16/18   Plan of Care Reviewed with: patient    Subjective     Communicated with nurse Mckenzie prior to session.  Patient found supine in bed upon PT entry to room, agreeable to evaluation.      Chief Complaint: Gets SOB with activity and when coughing.   Patient comments/goals: To walk.   Pain/Comfort:  · Pain Rating 1:  (soreness to right quad and when pain gets bad it takes his breath away )  · Location - Side 1: Right  · Location - Orientation 1: upper  · Location 1: leg  · Pain Addressed 1: Distraction, Cessation of Activity    Living Environment:  Patient lives with his spouse. On Thursday he moved from [place] to [place]. He used to have steps to climb but he no longer has them since he moved. Prior to admission, patients level of function was indepedent with ambulation. Patient has the following equipment: cane, straight, cane, quad, oxygen (only uses as needed). Upon discharge, patient will have assistance from spouse.    Objective:      Patient found with: peripheral IV, oxygen     General Precautions: Standard, fall, respiratory   Orthopedic Precautions:N/A   Braces: N/A     Exams:  · Cognitive Exam:  Patient is oriented to Person, Place, Time and Situation and follows 100 % of multi step commands   · Gross Motor Coordination:  WFL  · Postural Exam:  Patient presented with the following abnormalities:    · -       No postural abnormalities identified  · Sensation:    · -       Intact  · Skin Integrity/Edema:      · -       Skin integrity: Visible skin intact  · RUE ROM: WFL  · RUE Strength: WFL  · LUE ROM: WFL  · LUE Strength: WFL  · RLE ROM: WFL  · RLE Strength: WFL  · LLE ROM: WFL  · LLE Strength: WFL    Functional Mobility:  · Bed Mobility:     · Supine to Sit: supervision  · Transfers:     · Sit to Stand:  stand by assistance with rolling walker  · Gait:  Patient ambulated 140ft with Rolling Walker and SBA using 3-point gait. Patient demonstrated decreased ricardo, increased time in double stance, decreased velocity of limb motion and decreased step length during gait due to impaired balance, pain and decreased endurance.    AM-PAC 6 CLICK MOBILITY  Total Score:22     Therapeutic Activities and Exercises:   Patient educated in B UE and LE therex to perform while seated in bedside chair.     Patient left up in chair with all lines intact, call button in reach and nurse Magaly notified.    GOALS:    Physical Therapy Goals        Problem: Physical Therapy Goal    Goal Priority Disciplines Outcome Goal Variances Interventions   Physical Therapy Goal     PT/OT, PT      Description:  Goals to be met by: 18    Patient will increase functional independence with mobility by performin. Sit to stand transfer with Modified [place]  2. Gait x500 feet with Modified [place] using Rolling Walker   3. Upper and lower extremity exercise program x30 reps per handout, with independence                      History:     Past Medical  History:   Diagnosis Date    Cancer     Prostate    Glaucoma     H/O prostate cancer     s/p radioactive seed implants    HTN (hypertension)     Pulmonary mycobacterial infection     Renal calculi     Sarcoidosis of lung     Sick sinus syndrome     s/p pacemaker placement       Past Surgical History:   Procedure Laterality Date    CARDIAC CATHETERIZATION  8-22-13    normal [place] arteries, no pulmonary HTN    CARDIAC PACEMAKER PLACEMENT      EYE SURGERY      heart biopsy      no evidence of sarcoid    prostate seed implants       Time Tracking:     PT Received On: 07/02/18  PT Start Time: 1101     PT Stop Time: 1122  PT Total Time (min): 21 min     Billable Minutes: Evaluation  21    Leah Dove, PT  07/02/2018

## 2018-07-02 NOTE — PLAN OF CARE
Problem: ARDS (Acute Resp Distress Syndrome) (Adult)  Goal: Signs and Symptoms of Listed Potential Problems Will be Absent, Minimized or Managed (ARDS)  Signs and symptoms of listed potential problems will be absent, minimized or managed by discharge/transition of care (reference ARDS (Acute Resp Distress Syndrome) (Adult) CPG).   07/02/18 0807   ARDS (Acute Resp Distress Syndrome)   Problems Assessed (Acute Respiratory Distress Syndrome) all   Problems Present (Acute Respiratory Distress Syndrome) situational response   Continues on O2 at 2l/NC. Noted with intermittent periods of SOB. Continuing plan of care

## 2018-07-02 NOTE — PLAN OF CARE
Problem: Fall Risk (Adult)  Goal: Absence of Falls  Patient will demonstrate the desired outcomes by discharge/transition of care.   Outcome: Ongoing (interventions implemented as appropriate)   07/02/18 0806   Fall Risk (Adult)   Absence of Falls making progress toward outcome

## 2018-07-02 NOTE — PROGRESS NOTES
[facility] Hospital Medicine  Progress Note    Patient Name: Levi Melendez  MRN: [id number]  Patient Class: IP- Inpatient   Admission Date: 6/28/2018  Length of Stay: 4 days  Attending Physician: Nevaeh Caldera MD  Primary Care Provider: Tasha Brown MD        Subjective:     Principal Problem:COPD with acute exacerbation    HPI:  Mr. Levi Melendez is a 68 y.o. male with essential hypertension, hyperlipidemia (LDL unknown), COPD, chronic respiratory failure with hypoxia, sarcoidosis, history of sick sinus syndrome with permanent pacemaker in place, and history of prostate cancer who presents to [facility] ED with complaints of progressively worsening dyspnea for three weeks.  He reports that initially the shortness of breath would limit his mobility to one flight of stairs but it has progressively worsened to the point in the last couple of days that just maoing around in bed makes him dyspneic.  He feels fine at rest but does say that he is using his as-needed supplemental oxygen almost constantly now.  His dyspnea is associated with both wheezing and coughing that is occasionally productive of blood-tinged sputum; he does say that he has chest soreness only when he coughs.  He has subjective fevers and chills and reports that his appetite has been poor.  He has not had any sick contacts or recent travel.  He denies any lower extremity pain or swelling and has not experienced any palpitations or diaphoresis.      Hospital Course:  Mr Melendez presented with acute on chronic respiratory failure 2/2 COPD exacerbation. No clear etiology. CTA negative for central pulmonary embolism and small bilateral pleural effusions but with diffuse groundglass airspace opacities. Patient clinically improved with supplemental O2, solumedrol, empiric azithro/ceftriaxone and bronchodilators. He does have underlying sarcoidosis and CT also showed cavitary lesions in upper lobes with scarring and mediastinal  lymphadenopathy, which can be seen with sarcoidosis, TB and cancer. He reports to me he has been worked up for TB and was negative. [facility] requested [facility] and quant gold ordered (has had positive PPD in the past (with negative quant)).     Interval History: feels better today. No more blood in sputum. Was able to participate with PT. Although with SOB, it was not as bad as when he first came in.     Review of Systems   Constitutional: Negative for activity change, appetite change, chills, diaphoresis, fatigue, fever and unexpected weight change.   HENT: Negative.    Eyes: Negative.    Respiratory: Positive for shortness of breath. Negative for cough, chest tightness and wheezing.    Cardiovascular: Negative for chest pain, palpitations and leg swelling.   Gastrointestinal: Negative for abdominal distention, abdominal pain, blood in stool, constipation, diarrhea, nausea and vomiting.   Genitourinary: Negative for dysuria and hematuria.   Musculoskeletal: Negative.    Skin: Negative.    Neurological: Negative for dizziness, seizures, syncope, weakness and light-headedness.   Psychiatric/Behavioral: Negative.      Objective:     Vital Signs (Most Recent):  Temp: 97.8 °F (36.6 °C) (07/02/18 1100)  Pulse: 77 (07/02/18 1301)  Resp: 18 (07/02/18 1301)  BP: 128/76 (07/02/18 1100)  SpO2: 97 % (07/02/18 1301) Vital Signs (24h Range):  Temp:  [97.5 °F (36.4 °C)-97.8 °F (36.6 °C)] 97.8 °F (36.6 °C)  Pulse:  [] 77  Resp:  [16-20] 18  SpO2:  [92 %-100 %] 97 %  BP: (128-146)/(76-83) 128/76     Weight: 96.6 kg (212 lb 15.4 oz)  Body mass index is 28.88 kg/m².    Intake/Output Summary (Last 24 hours) at 07/02/18 1416  Last data filed at 07/01/18 1730   Gross per 24 hour   Intake              240 ml   Output                0 ml   Net              240 ml      Physical Exam   Constitutional: He is oriented to person, place, and time. He appears well-developed and well-nourished. No distress.   HENT:   Head: Normocephalic and  atraumatic.   Right Ear: External ear normal.   Left Ear: External ear normal.   Nose: Nose normal.   Eyes: Right eye exhibits no discharge. Left eye exhibits no discharge.   Neck: Normal range of motion.   Cardiovascular: Normal rate, regular rhythm, normal heart sounds and intact distal pulses.  Exam reveals no gallop and no friction rub.    No murmur heard.  Pulmonary/Chest: Effort normal and breath sounds normal. He has no wheezes. He has no rales.   Abdominal: Soft. Bowel sounds are normal. He exhibits no distension. There is no tenderness. There is no rebound and no guarding.   Musculoskeletal: Normal range of motion. He exhibits no edema.   Neurological: He is alert and oriented to person, place, and time.   Skin: Skin is warm and dry. He is not diaphoretic. No erythema.   Psychiatric: He has a normal mood and affect. His behavior is normal. Judgment and thought content normal.   Nursing note and vitals reviewed.      Significant Labs: All pertinent labs within the past 24 hours have been reviewed.    Significant Imaging: I have reviewed all pertinent imaging results/findings within the past 24 hours.  I have reviewed and interpreted all pertinent imaging results/findings within the past 24 hours.    Assessment/Plan:      * COPD with acute exacerbation    Patient has a known history of COPD but also with sarcoidosis. CTA without evidence of central PE but subsegmental and distal could not be excluded. Also with small bilateral pleural effusions, diffuse ground glass opacities and cavitary lesions with mediastinal lymphadenopathy. Given known history of sarcoidosis, these are likely chronic, however I need records from [facility] to confirm (requested). Will add quantiferon gold while waiting for records to be sent. Hold off on CPAP as it caused negative effect on patient's symptoms. De-esc to prednisone 40 mg daily (he takes 10 mg at home), continue bronchodilators and empiric azithromycin + ceftriaxone, maintenance  LABA/ICS and inhl anticholinergic, continue with duo-neb treatments. Supplemental O2 as needed for goal sats 88-93%.        Acute on chronic respiratory failure with hypoxia    As addressed above.        History of prostate cancer    Stable; there are no acute issues.        Presence of permanent cardiac pacemaker    Stable; there are no acute issues.        History of sick sinus syndrome    Stable; there are no acute issues.        COPD (chronic obstructive pulmonary disease)    As addressed above.        Hyperlipidemia    Patient is not on pravastatin        Essential hypertension    Patient's blood pressure is better-controlled; will continue home regimen of  metoprolol, and valsartan, and provide as-needed clonidine.        Elevated troponin    Patient's first two sets of troponins are flat with the first measurement being 0.151 and a repeat of 0.154.  This is likely due to demand.  I have reviewed the EKG and it reveals sinus tachycardia without evidence of acute ischemia.         Sarcoidosis    As addressed above; will continue his home regimen of prednisone           VTE Risk Mitigation         Ordered     IP VTE LOW RISK PATIENT  Once      06/28/18 2050              Nevaeh Tucker MD  Department of Hospital Medicine   [facility]

## 2018-07-02 NOTE — PLAN OF CARE
Problem: Physical Therapy Goal  Goal: Physical Therapy Goal  Goals to be met by: 18    Patient will increase functional independence with mobility by performin. Sit to stand transfer with Modified [place]  2. Gait x500 feet with Modified [place] using Rolling Walker   3. Upper and lower extremity exercise program x30 reps per handout, with independence      Patient ambulated 140ft with RW and SBA. He would benefit from RW and HH PT at discharge.

## 2018-07-02 NOTE — ASSESSMENT & PLAN NOTE
Patient has a known history of COPD but also with sarcoidosis. CTA without evidence of central PE but subsegmental and distal could not be excluded. Also with small bilateral pleural effusions, diffuse ground glass opacities and cavitary lesions with mediastinal lymphadenopathy. Given known history of sarcoidosis, these are likely chronic, however I need records [facility] to confirm (requested). Will add quantiferon gold while waiting for records to be sent. Hold off on CPAP as it caused negative effect on patient's symptoms. De-esc to prednisone 40 mg daily (he takes 10 mg at home), continue bronchodilators and empiric azithromycin + ceftriaxone, maintenance LABA/ICS and inhl anticholinergic, continue with duo-neb treatments. Supplemental O2 as needed for goal sats 88-93%.

## 2018-07-03 LAB
ANION GAP SERPL CALC-SCNC: 7 MMOL/L
BUN SERPL-MCNC: 25 MG/DL
CALCIUM SERPL-MCNC: 9.1 MG/DL
CHLORIDE SERPL-SCNC: 101 MMOL/L
CO2 SERPL-SCNC: 32 MMOL/L
CREAT SERPL-MCNC: 1.1 MG/DL
EST. GFR  (AFRICAN AMERICAN): >60 ML/MIN/1.73 M^2
EST. GFR  (NON AFRICAN AMERICAN): >60 ML/MIN/1.73 M^2
GLUCOSE SERPL-MCNC: 84 MG/DL
POTASSIUM SERPL-SCNC: 4 MMOL/L
SODIUM SERPL-SCNC: 140 MMOL/L

## 2018-07-03 PROCEDURE — 25000242 PHARM REV CODE 250 ALT 637 W/ HCPCS: Performed by: INTERNAL MEDICINE

## 2018-07-03 PROCEDURE — 94761 N-INVAS EAR/PLS OXIMETRY MLT: CPT

## 2018-07-03 PROCEDURE — 27000221 HC OXYGEN, UP TO 24 HOURS

## 2018-07-03 PROCEDURE — 36415 COLL VENOUS BLD VENIPUNCTURE: CPT

## 2018-07-03 PROCEDURE — 94640 AIRWAY INHALATION TREATMENT: CPT

## 2018-07-03 PROCEDURE — 86480 TB TEST CELL IMMUN MEASURE: CPT

## 2018-07-03 PROCEDURE — 21400001 HC TELEMETRY ROOM

## 2018-07-03 PROCEDURE — 80048 BASIC METABOLIC PNL TOTAL CA: CPT

## 2018-07-03 PROCEDURE — 25000003 PHARM REV CODE 250: Performed by: INTERNAL MEDICINE

## 2018-07-03 PROCEDURE — 63600175 PHARM REV CODE 636 W HCPCS: Performed by: INTERNAL MEDICINE

## 2018-07-03 RX ADMIN — TIOTROPIUM BROMIDE 18 MCG: 18 CAPSULE ORAL; RESPIRATORY (INHALATION) at 08:07

## 2018-07-03 RX ADMIN — GUAIFENESIN 200 MG: 200 SOLUTION ORAL at 06:07

## 2018-07-03 RX ADMIN — AZITHROMYCIN MONOHYDRATE 250 MG: 250 TABLET ORAL at 09:07

## 2018-07-03 RX ADMIN — PREDNISONE 40 MG: 20 TABLET ORAL at 09:07

## 2018-07-03 RX ADMIN — FLUTICASONE FUROATE AND VILANTEROL TRIFENATATE 1 PUFF: 200; 25 POWDER RESPIRATORY (INHALATION) at 08:07

## 2018-07-03 RX ADMIN — GUAIFENESIN 200 MG: 200 SOLUTION ORAL at 12:07

## 2018-07-03 RX ADMIN — IPRATROPIUM BROMIDE AND ALBUTEROL SULFATE 3 ML: .5; 2.5 SOLUTION RESPIRATORY (INHALATION) at 12:07

## 2018-07-03 RX ADMIN — PANTOPRAZOLE SODIUM 40 MG: 40 TABLET, DELAYED RELEASE ORAL at 09:07

## 2018-07-03 RX ADMIN — IPRATROPIUM BROMIDE AND ALBUTEROL SULFATE 3 ML: .5; 2.5 SOLUTION RESPIRATORY (INHALATION) at 08:07

## 2018-07-03 RX ADMIN — GUAIFENESIN 600 MG: 600 TABLET, EXTENDED RELEASE ORAL at 09:07

## 2018-07-03 RX ADMIN — ASPIRIN 81 MG 81 MG: 81 TABLET ORAL at 09:07

## 2018-07-03 RX ADMIN — CEFTRIAXONE 1 G: 1 INJECTION, SOLUTION INTRAVENOUS at 09:07

## 2018-07-03 RX ADMIN — GUAIFENESIN 600 MG: 600 TABLET, EXTENDED RELEASE ORAL at 08:07

## 2018-07-03 RX ADMIN — VALSARTAN 20 MG: 40 TABLET, FILM COATED ORAL at 09:07

## 2018-07-03 RX ADMIN — VALSARTAN 20 MG: 40 TABLET, FILM COATED ORAL at 08:07

## 2018-07-03 RX ADMIN — GUAIFENESIN 200 MG: 200 SOLUTION ORAL at 05:07

## 2018-07-03 RX ADMIN — METOPROLOL SUCCINATE 100 MG: 50 TABLET, EXTENDED RELEASE ORAL at 09:07

## 2018-07-03 NOTE — SUBJECTIVE & OBJECTIVE
Interval History: Doing better.     Review of Systems   Constitutional: Negative for activity change.   Respiratory: Negative for chest tightness and shortness of breath.    Cardiovascular: Negative for chest pain.   Gastrointestinal: Negative for abdominal pain.   Genitourinary: Negative for difficulty urinating.     Objective:     Vital Signs (Most Recent):  Temp: 97.8 °F (36.6 °C) (07/03/18 0501)  Pulse: 75 (07/03/18 0814)  Resp: 18 (07/03/18 0814)  BP: (!) 142/91 (07/03/18 0501)  SpO2: 98 % (07/03/18 0814) Vital Signs (24h Range):  Temp:  [96.7 °F (35.9 °C)-98.1 °F (36.7 °C)] 97.8 °F (36.6 °C)  Pulse:  [74-94] 75  Resp:  [18] 18  SpO2:  [90 %-100 %] 98 %  BP: (114-142)/(70-91) 142/91     Weight: 96.6 kg (212 lb 15.4 oz)  Body mass index is 28.88 kg/m².    Intake/Output Summary (Last 24 hours) at 07/03/18 0831  Last data filed at 07/03/18 0600   Gross per 24 hour   Intake              720 ml   Output              850 ml   Net             -130 ml      Physical Exam   Constitutional: He is oriented to person, place, and time. He appears well-developed and well-nourished.   HENT:   Head: Normocephalic and atraumatic.   Pulmonary/Chest: Breath sounds normal. No respiratory distress. He has no wheezes. He has no rales.   Neurological: He is alert and oriented to person, place, and time.   Vitals reviewed.      Significant Labs:   BMP:   Recent Labs  Lab 07/03/18  0714   GLU 84      K 4.0      CO2 32*   BUN 25*   CREATININE 1.1   CALCIUM 9.1     CBC: No results for input(s): WBC, HGB, HCT, PLT in the last 48 hours.    Significant Imaging:

## 2018-07-03 NOTE — PLAN OF CARE
Problem: Fall Risk (Adult)  Goal: Absence of Falls  Patient will demonstrate the desired outcomes by discharge/transition of care.   Outcome: Ongoing (interventions implemented as appropriate)   07/03/18 0413   Fall Risk (Adult)   Absence of Falls making progress toward outcome   Remains free from injury.

## 2018-07-03 NOTE — PROGRESS NOTES
07/03/18 0812   Patient Assessment/Suction   Level of Consciousness (AVPU) alert   All Lung Fields Breath Sounds diminished   Cough Type none   PRE-TX-O2-ETCO2   O2 Device (Oxygen Therapy) nasal cannula   $ Is the patient on Low Flow Oxygen? Yes   Flow (L/min) 3   SpO2 98 %   Pulse Oximetry Type Intermittent   $ Pulse Oximetry - Multiple Charge Pulse Oximetry - Multiple   Pulse 75   Resp 18   Aerosol Therapy   $ Aerosol Therapy Charges Aerosol Treatment   Respiratory Treatment Status given   SVN/Inhaler Treatment Route mask   Position During Treatment HOB at 90 degrees   Patient Tolerance good   Post-Treatment   Post-treatment Heart Rate (beats/min) 78   Post-treatment Resp Rate (breaths/min) 18   All Fields Breath Sounds unchanged

## 2018-07-03 NOTE — NURSING
Spoke with Dr. Salazar to inquire if pt needs to be on isolation. Dr. Salazar states isolation is not needed at this time.

## 2018-07-03 NOTE — PLAN OF CARE
Problem: ARDS (Acute Resp Distress Syndrome) (Adult)  Goal: Signs and Symptoms of Listed Potential Problems Will be Absent, Minimized or Managed (ARDS)  Signs and symptoms of listed potential problems will be absent, minimized or managed by discharge/transition of care (reference ARDS (Acute Resp Distress Syndrome) (Adult) CPG).    07/03/18 0409   ARDS (Acute Resp Distress Syndrome)   Problems Assessed (Acute Respiratory Distress Syndrome) hypoxia/hypoxemia;progression of ARDS;situational response   Problems Present (Acute Respiratory Distress Syndrome) situational response   Resp treatment continuing; remains on O2 at 2l/NC. Denies any episodes of resp distress during the night.

## 2018-07-03 NOTE — PLAN OF CARE
Problem: Fall Risk (Adult)  Intervention: Review Medications/Identify Contributors to Fall Risk   07/02/18 1924   Safety Interventions   Medication Review/Management medications reviewed     Intervention: Patient Rounds   07/02/18 1924   Safety Interventions   Patient Rounds bed wheels locked;clutter free environment maintained;bed in low position;placement of personal items at bedside;toileting offered;visualized patient;ID band on;call light in reach     Intervention: Safety Promotion/Fall Prevention   07/02/18 1924   Safety Interventions   Safety Promotion/Fall Prevention side rails raised x 2;bed alarm set;room near unit station       Goal: Absence of Falls  Patient will demonstrate the desired outcomes by discharge/transition of care.    07/02/18 1924   Fall Risk (Adult)   Absence of Falls making progress toward outcome       Problem: Patient Care Overview  Goal: Plan of Care Review   07/02/18 1924   Coping/Psychosocial   Plan Of Care Reviewed With patient     Goal: Interdisciplinary Rounds/Family Conf   07/02/18 1924   Interdisciplinary Rounds/Family Conf   Participants patient;nursing;physician       Problem: ARDS (Acute Resp Distress Syndrome) (Adult)  Intervention: Position to Optimize Ventilation   07/02/18 1924   Positioning   Body Position positioned/repositioned independently     Intervention: Provide Preventive/Supportive Care   07/02/18 1924   Skin Interventions   Skin Protection Adhesive use limited     Intervention: Support/Optimize Psychosocial Response to Illness   07/02/18 1924   Coping/Psychosocial Interventions   Supportive Measures active listening utilized;positive reinforcement provided;relaxation techniques promoted

## 2018-07-03 NOTE — PROGRESS NOTES
[facility] Hospital Medicine  Progress Note    Patient Name: Levi Melendez  MRN: [id number]  Patient Class: IP- Inpatient   Admission Date: 6/28/2018  Length of Stay: 5 days  Attending Physician: Yonas Reyes MD  Primary Care Provider: Tasha Brown MD        Subjective:     Principal Problem:COPD with acute exacerbation    HPI:  Mr. Levi Melendez is a 68 y.o. male with essential hypertension, hyperlipidemia (LDL unknown), COPD, chronic respiratory failure with hypoxia, sarcoidosis, history of sick sinus syndrome with permanent pacemaker in place, and history of prostate cancer who presents to [facility] ED with complaints of progressively worsening dyspnea for three weeks.  He reports that initially the shortness of breath would limit his mobility to one flight of stairs but it has progressively worsened to the point in the last couple of days that just maoing around in bed makes him dyspneic.  He feels fine at rest but does say that he is using his as-needed supplemental oxygen almost constantly now.  His dyspnea is associated with both wheezing and coughing that is occasionally productive of blood-tinged sputum; he does say that he has chest soreness only when he coughs.  He has subjective fevers and chills and reports that his appetite has been poor.  He has not had any sick contacts or recent travel.  He denies any lower extremity pain or swelling and has not experienced any palpitations or diaphoresis.      Hospital Course:  Mr Melendez presented with acute on chronic respiratory failure 2/2 COPD exacerbation. No clear etiology. CTA negative for central pulmonary embolism and small bilateral pleural effusions but with diffuse groundglass airspace opacities. Patient clinically improved with supplemental O2, solumedrol, empiric azithro/ceftriaxone and bronchodilators. He does have underlying sarcoidosis and CT also showed cavitary lesions in upper lobes with scarring and mediastinal  lymphadenopathy, which can be seen with sarcoidosis, TB and cancer. He reports to me he has been worked up for TB and was negative. [facility] and [facility] (has had positive PPD in the past (with negative quant)). Patient clinically improved over the course of several days.     Interval History: Doing better.     Review of Systems   Constitutional: Negative for activity change.   Respiratory: Negative for chest tightness and shortness of breath.    Cardiovascular: Negative for chest pain.   Gastrointestinal: Negative for abdominal pain.   Genitourinary: Negative for difficulty urinating.     Objective:     Vital Signs (Most Recent):  Temp: 97.8 °F (36.6 °C) (07/03/18 0501)  Pulse: 75 (07/03/18 0814)  Resp: 18 (07/03/18 0814)  BP: (!) 142/91 (07/03/18 0501)  SpO2: 98 % (07/03/18 0814) Vital Signs (24h Range):  Temp:  [96.7 °F (35.9 °C)-98.1 °F (36.7 °C)] 97.8 °F (36.6 °C)  Pulse:  [74-94] 75  Resp:  [18] 18  SpO2:  [90 %-100 %] 98 %  BP: (114-142)/(70-91) 142/91     Weight: 96.6 kg (212 lb 15.4 oz)  Body mass index is 28.88 kg/m².    Intake/Output Summary (Last 24 hours) at 07/03/18 0831  Last data filed at 07/03/18 0600   Gross per 24 hour   Intake              720 ml   Output              850 ml   Net             -130 ml      Physical Exam   Constitutional: He is oriented to person, place, and time. He appears well-developed and well-nourished.   HENT:   Head: Normocephalic and atraumatic.   Pulmonary/Chest: Breath sounds normal. No respiratory distress. He has no wheezes. He has no rales.   Neurological: He is alert and oriented to person, place, and time.   Vitals reviewed.      Significant Labs:   BMP:   Recent Labs  Lab 07/03/18  0714   GLU 84      K 4.0      CO2 32*   BUN 25*   CREATININE 1.1   CALCIUM 9.1     CBC: No results for input(s): WBC, HGB, HCT, PLT in the last 48 hours.    Significant Imaging:    Assessment/Plan:      * COPD with acute exacerbation    Patient has  a known history of COPD but also with sarcoidosis. CTA without evidence of central PE but subsegmental and distal could not be excluded. Also with small bilateral pleural effusions, diffuse ground glass opacities and cavitary lesions with mediastinal lymphadenopathy. Given known history of sarcoidosis, these are likely chronic, however [facility] (requested). Will add quantiferon gold while waiting for records to be sent. Hold off on CPAP as it caused negative effect on patient's symptoms. De-esc to prednisone 40 mg daily (he takes 10 mg at home), continue bronchodilators and empiric azithromycin + ceftriaxone, maintenance LABA/ICS and inhl anticholinergic, continue with duo-neb treatments. Supplemental O2 as needed for goal sats 88-93%.        History of prostate cancer    Stable; there are no acute issues.        Presence of permanent cardiac pacemaker    Stable; there are no acute issues.        History of sick sinus syndrome    Stable; there are no acute issues.        Acute on chronic respiratory failure with hypoxia    As addressed above.  Is on home 02.         COPD (chronic obstructive pulmonary disease)    As addressed above.        Hyperlipidemia    Patient is not on pravastatin        Essential hypertension    Patient's blood pressure is better-controlled; will continue home regimen of  metoprolol, and valsartan, and provide as-needed clonidine.        Elevated troponin    Patient's first two sets of troponins are flat with the first measurement being 0.151 and a repeat of 0.154.  This is likely due to demand.  I have reviewed the EKG and it reveals sinus tachycardia without evidence of acute ischemia.         Sarcoidosis    As addressed above; will continue his home regimen of prednisone           VTE Risk Mitigation         Ordered     IP VTE LOW RISK PATIENT  Once      06/28/18 2050        Continue current care. Wean to po steroids. Home likely on 7/4.       Yonas Salazar,  MD  Department of Hospital Medicine   [facility]

## 2018-07-03 NOTE — PROGRESS NOTES
07/02/18 1936   Patient Assessment/Suction   Level of Consciousness (AVPU) alert   Respiratory Effort Normal;Unlabored   All Lung Fields Breath Sounds diminished   PRE-TX-O2-ETCO2   O2 Device (Oxygen Therapy) nasal cannula   Flow (L/min) 3   Oxygen Concentration (%) 32   SpO2 98 %   Pulse Oximetry Type Intermittent   Pulse 79   Resp 18   Aerosol Therapy   $ Aerosol Therapy Charges Aerosol Treatment   Respiratory Treatment Status given   SVN/Inhaler Treatment Route mask   Position During Treatment Sitting in bed   Patient Tolerance good   Post-Treatment   Post-treatment Heart Rate (beats/min) 80   Post-treatment Resp Rate (breaths/min) 18   All Fields Breath Sounds unchanged   Ready to Wean/Extubation Screen   FIO2<=50 (chart decimal) 0.32

## 2018-07-04 VITALS
HEART RATE: 77 BPM | WEIGHT: 212.94 LBS | OXYGEN SATURATION: 99 % | HEIGHT: 72 IN | TEMPERATURE: 98 F | RESPIRATION RATE: 17 BRPM | BODY MASS INDEX: 28.84 KG/M2 | DIASTOLIC BLOOD PRESSURE: 85 MMHG | SYSTOLIC BLOOD PRESSURE: 132 MMHG

## 2018-07-04 PROBLEM — R79.89 ELEVATED TROPONIN: Status: RESOLVED | Noted: 2018-06-29 | Resolved: 2018-07-04

## 2018-07-04 PROBLEM — J44.1 COPD WITH ACUTE EXACERBATION: Status: RESOLVED | Noted: 2018-06-29 | Resolved: 2018-07-04

## 2018-07-04 PROBLEM — J96.21 ACUTE ON CHRONIC RESPIRATORY FAILURE WITH HYPOXIA: Status: RESOLVED | Noted: 2018-06-29 | Resolved: 2018-07-04

## 2018-07-04 PROCEDURE — 25000003 PHARM REV CODE 250: Performed by: INTERNAL MEDICINE

## 2018-07-04 PROCEDURE — 94761 N-INVAS EAR/PLS OXIMETRY MLT: CPT

## 2018-07-04 PROCEDURE — 94640 AIRWAY INHALATION TREATMENT: CPT

## 2018-07-04 PROCEDURE — 27000221 HC OXYGEN, UP TO 24 HOURS

## 2018-07-04 PROCEDURE — 25000242 PHARM REV CODE 250 ALT 637 W/ HCPCS: Performed by: INTERNAL MEDICINE

## 2018-07-04 PROCEDURE — 63600175 PHARM REV CODE 636 W HCPCS: Performed by: INTERNAL MEDICINE

## 2018-07-04 RX ORDER — PREDNISONE 20 MG/1
TABLET ORAL
Qty: 15 TABLET | Refills: 0 | Status: SHIPPED | OUTPATIENT
Start: 2018-07-04

## 2018-07-04 RX ADMIN — GUAIFENESIN 200 MG: 200 SOLUTION ORAL at 06:07

## 2018-07-04 RX ADMIN — GUAIFENESIN 200 MG: 200 SOLUTION ORAL at 01:07

## 2018-07-04 RX ADMIN — VALSARTAN 20 MG: 40 TABLET, FILM COATED ORAL at 09:07

## 2018-07-04 RX ADMIN — FLUTICASONE FUROATE AND VILANTEROL TRIFENATATE 1 PUFF: 200; 25 POWDER RESPIRATORY (INHALATION) at 08:07

## 2018-07-04 RX ADMIN — AZITHROMYCIN MONOHYDRATE 250 MG: 250 TABLET ORAL at 09:07

## 2018-07-04 RX ADMIN — CEFTRIAXONE 1 G: 1 INJECTION, SOLUTION INTRAVENOUS at 09:07

## 2018-07-04 RX ADMIN — ASPIRIN 81 MG 81 MG: 81 TABLET ORAL at 09:07

## 2018-07-04 RX ADMIN — GUAIFENESIN 200 MG: 200 SOLUTION ORAL at 12:07

## 2018-07-04 RX ADMIN — IPRATROPIUM BROMIDE AND ALBUTEROL SULFATE 3 ML: .5; 2.5 SOLUTION RESPIRATORY (INHALATION) at 08:07

## 2018-07-04 RX ADMIN — TIOTROPIUM BROMIDE 18 MCG: 18 CAPSULE ORAL; RESPIRATORY (INHALATION) at 08:07

## 2018-07-04 RX ADMIN — PANTOPRAZOLE SODIUM 40 MG: 40 TABLET, DELAYED RELEASE ORAL at 09:07

## 2018-07-04 RX ADMIN — METOPROLOL SUCCINATE 100 MG: 50 TABLET, EXTENDED RELEASE ORAL at 09:07

## 2018-07-04 RX ADMIN — IPRATROPIUM BROMIDE AND ALBUTEROL SULFATE 3 ML: .5; 2.5 SOLUTION RESPIRATORY (INHALATION) at 12:07

## 2018-07-04 RX ADMIN — GUAIFENESIN 600 MG: 600 TABLET, EXTENDED RELEASE ORAL at 09:07

## 2018-07-04 RX ADMIN — PREDNISONE 40 MG: 20 TABLET ORAL at 09:07

## 2018-07-04 NOTE — ASSESSMENT & PLAN NOTE
As addressed above; will continue his home regimen of prednisone. Patient has cavitary lesions upper lung zone- seen in granulomatous diseases such as sarcoid. Has been worked up for TB in the past- negative- so doubt TB.

## 2018-07-04 NOTE — NURSING
Provided wife with patient relation number and  dandre sun [phone] encouraged her to call her regarding the patients home oxygen tank . Attempts were made to try and locate the tank in emergency department. Patient is now discharged home wife accompanying patient to car has oxygen on at 2 liters per home rate and again reminded the wife to bring the oxygen tank back today . Wife states I will .

## 2018-07-04 NOTE — PROGRESS NOTES
WRITTEN HEALTHCARE DISCHARGE INFORMATION     Things that YOU are RESPONSIBLE for to Manage Your Care At Home:    1. Getting your prescriptions filled.  2. Taking you medications as directed. DO NOT MISS ANY DOSES!  3. Going to your follow-up doctor appointments. This is important because it allows the doctor to monitor your progress and to determine if any changes need to be made to your treatment plan.    If you are unable to make your follow up appointments, please call the number listed and reschedule this appointment.     ____________HELP AT HOME____________________    Experiencing any SIGNS or SYMPTOMS: YOU CAN    Schedule a same day appopintment with your Primary Care Doctor or  you can call [facility] On Call Nurse Care Line for 24/7 assistance at [phone]    If you are experience any signs or symptoms that have become severe, Call 911 and come to your nearest Emergency Room.    Thank you for choosing [facility] and allowing us to care for you.   From your care management team: REMI Vizcarra, [facility] [phone]     You should receive a call from [facility]charge [facility] within 48-72 hours to help manage your care after discharge. Please try to make sure that you answer your phone for this important phone call.     Follow-up Information     's Administration.    Why:  [phone] option 3 Call to have PCP change and to schedule appointment.  Contact information:  [address] [facility] [address] [phone]             Call Tasha Brown MD.    Specialty:  Nephrology  Why:  Call to schedule an appointment in one week.   Contact information:  [address] [facility] [address] [phone]

## 2018-07-04 NOTE — ASSESSMENT & PLAN NOTE
Patient has a known history of COPD but also with sarcoidosis. CTA without evidence of central PE but subsegmental and distal could not be excluded. Also with small bilateral pleural effusions, diffuse ground glass opacities and cavitary lesions with mediastinal lymphadenopathy. Given known history of sarcoidosis, these are likely chronic, however [facility] ([facility]). Will add quantiferon gold while waiting for records to be sent. Hold off on CPAP as it caused negative effect on patient's symptoms. De-esc to prednisone 40 mg daily (he takes 10 mg at home), continue bronchodilators and empiric azithromycin + ceftriaxone, maintenance LABA/ICS and inhl anticholinergic, continue with duo-neb treatments. Supplemental O2 as needed for goal sats 88-93%. He is home 02 dependent.

## 2018-07-04 NOTE — DISCHARGE INSTRUCTIONS
Discharge instructions reviewed with patient at bedside no prescriptions given to patient prednisone 5 mg po daily , prednisone 20 mg two tablets by mouth daily x 5 days then one tablet by mouth for 5 days. Resume taking continued medications . Keep any follow up appointments and bring the AVS booklet with you to any follow up appointments . Patient voiced understanding.

## 2018-07-04 NOTE — NURSING
Bedside rounding report received from macario horn rn on patients progress and updated handoff report sheet received too. Assessment completed and plan for today reviewed with patient and updated on board in room. Bed alarm on call light in reach oxygen in use at 3 liters.

## 2018-07-04 NOTE — NURSING
"1230- patient ate breakfast meal tolerated well. Denies pain when asked . No changes on telemetry . Wife at bedside. No changes on telemetry .call light in reach head of bed elevated side rail up x 2 bed alarm on. Head of bed elevated oxygen on at 3 liters by nasal canula.     1319-gave a prescription for prednisone to patient and reviewed AVS booklet with patient and his wife reviewed prednisone and how to take tapering doses ordered patient voiced understanding. Reviewed continued medications on MAR and discontinued medications on AVS booklet instructed the patient to call to schedule appointment at [facility] and dr caceres call to schedule appointment in one week. Patient voiced understanding and stated he will call to schedule both appointments. Unable to locate patients oxygen tank called [facility] patient does not exactly recall what happened to his tank when he was in emergency area of care provided a tank with 2000 psi or half full tank instructed the wife to bring this tank back today I have her my link number [phone] when she arrives and I will go downstairs to pick it up. Mrs jaymie draper states," we live about 15 minutes from here I will bring it back when I get my  home my son is coming to pick him up cause I don't have an air conditioner in my car."   "

## 2018-07-04 NOTE — PLAN OF CARE
"   07/04/18 1142   Final Note   Assessment Type Final Discharge Note   Discharge Disposition Home   What phone number can be called within the next 1-3 days to see how you are doing after discharge? [phone]   Hospital Follow Up  Appt(s) scheduled? Yes   Discharge plans and expectations educations in teach back method with documentation complete? Yes   Right Care Referral Info   Post Acute Recommendation No Care     EDUCATION:  Pt provided with educational information on " COPD ".  Information reviewed and placed in :My Healthcare Packet" to be brought home for pt to use as resource after discharge.  Information included:  signs and symptoms to look for and call the doctor if experiencing, and symptoms that may indicate a medical emergency: CALL 911.      All questions answered.  Teach back method used.  Pt  verbalized understanding of all information by stating, "  That he is aware of the signs and symptoms to watch for and when to contact MD and when to report to the ED immediately. Also SW took this time to ask pt to provide at least 3 symptoms. Pt stated SOB, Chest Tightness, and Weakness.    CANDIE notified JORDEN Hernandes. Pt cleared to D/C from CM standpoint. CANDIE stated that prescriptions should be given to pt to get filled at pharmacy. CANDIE informed pt and spouse that he will have to get his prescriptions filled right now with [facility] or [facility]. Both voiced understanding. Pt voiced that he came with an O2 tank and will need to get tank back before leaving hospital. CANDIE notified CANDIS Shetty and JORDEN Hernandes.      "

## 2018-07-04 NOTE — PLAN OF CARE
Problem: Patient Care Overview  Goal: Plan of Care Review  Patient received on nasal cannula 3 lpm. Aerosol treatment given as ordered.

## 2018-07-04 NOTE — NURSING
Asleep in bed , AAOx4 oxygen per nasal cannula , HL to rt arm intact . Patient voices no complaints of pain or discomfort at this time . SRx2 safety maintained .

## 2018-07-04 NOTE — NURSING
0910- ate all  Of breakfast meal denies pain . Head of bed elevated side rails up x 3 call light in reach telemetry is on and working no acute change bed alarm on call light in reach head of bed elevated .     1028- no acute needs or changes bed alarm on call light in reach head of bed elevated side rails up x 3 oxygen at 3 liters in use connected properly . Bed alarm on.

## 2018-07-04 NOTE — SUBJECTIVE & OBJECTIVE
Interval History: No new issues. States he is close to his baseline.     Review of Systems   Constitutional: Negative for activity change.   Respiratory: Negative for chest tightness and shortness of breath.    Cardiovascular: Negative for chest pain.   Gastrointestinal: Negative for abdominal pain.   Genitourinary: Negative for difficulty urinating.     Objective:     Vital Signs (Most Recent):  Temp: 98.6 °F (37 °C) (07/04/18 0751)  Pulse: 71 (07/04/18 0831)  Resp: 16 (07/04/18 0831)  BP: (!) 144/84 (07/04/18 0751)  SpO2: 98 % (07/04/18 0817) Vital Signs (24h Range):  Temp:  [97.5 °F (36.4 °C)-98.6 °F (37 °C)] 98.6 °F (37 °C)  Pulse:  [66-80] 71  Resp:  [16-20] 16  SpO2:  [97 %-100 %] 98 %  BP: (128-166)/(74-88) 144/84     Weight: 96.6 kg (212 lb 15.4 oz)  Body mass index is 28.88 kg/m².    Intake/Output Summary (Last 24 hours) at 07/04/18 1115  Last data filed at 07/04/18 1000   Gross per 24 hour   Intake             1176 ml   Output              900 ml   Net              276 ml      Physical Exam   Constitutional: He is oriented to person, place, and time. He appears well-developed and well-nourished.   HENT:   Head: Normocephalic and atraumatic.   Pulmonary/Chest: Breath sounds normal. No respiratory distress. He has no wheezes. He has no rales.   Neurological: He is alert and oriented to person, place, and time.   Vitals reviewed.      Significant Labs:   BMP:   Recent Labs  Lab 07/03/18  0714   GLU 84      K 4.0      CO2 32*   BUN 25*   CREATININE 1.1   CALCIUM 9.1     CBC: No results for input(s): WBC, HGB, HCT, PLT in the last 48 hours.    Significant Imaging:

## 2018-07-04 NOTE — PROGRESS NOTES
[facility] Hospital Medicine  Progress Note    Patient Name: Levi Melendez  MRN: [id number]  Patient Class: IP- Inpatient   Admission Date: 6/28/2018  Length of Stay: 6 days  Attending Physician: Yonas Reyes MD  Primary Care Provider: Tasha Brown MD        Subjective:     Principal Problem:COPD with acute exacerbation    HPI:  Mr. Levi Melendez is a 68 y.o. male with essential hypertension, hyperlipidemia (LDL unknown), COPD, chronic respiratory failure with hypoxia, sarcoidosis, history of sick sinus syndrome with permanent pacemaker in place, and history of prostate cancer who presents to [facility] ED with complaints of progressively worsening dyspnea for three weeks.  He reports that initially the shortness of breath would limit his mobility to one flight of stairs but it has progressively worsened to the point in the last couple of days that just maoing around in bed makes him dyspneic.  He feels fine at rest but does say that he is using his as-needed supplemental oxygen almost constantly now.  His dyspnea is associated with both wheezing and coughing that is occasionally productive of blood-tinged sputum; he does say that he has chest soreness only when he coughs.  He has subjective fevers and chills and reports that his appetite has been poor.  He has not had any sick contacts or recent travel.  He denies any lower extremity pain or swelling and has not experienced any palpitations or diaphoresis.      Hospital Course:  Mr Melendez presented with acute on chronic respiratory failure 2/2 COPD exacerbation. No clear etiology. CTA negative for central pulmonary embolism and small bilateral pleural effusions but with diffuse groundglass airspace opacities. Patient clinically improved with supplemental O2, solumedrol, empiric azithro/ceftriaxone and bronchodilators. He does have underlying sarcoidosis and CT also showed cavitary lesions in upper lobes with scarring and mediastinal  lymphadenopathy, which can be seen with sarcoidosis, TB and cancer. He reports to me he has been worked up for TB and was negative. [facility] and [facility] gold ordered (has had positive PPD in the past (with negative quant)). Patient clinically improved over the course of several days.     Interval History: No new issues. States he is close to his baseline.     Review of Systems   Constitutional: Negative for activity change.   Respiratory: Negative for chest tightness and shortness of breath.    Cardiovascular: Negative for chest pain.   Gastrointestinal: Negative for abdominal pain.   Genitourinary: Negative for difficulty urinating.     Objective:     Vital Signs (Most Recent):  Temp: 98.6 °F (37 °C) (07/04/18 0751)  Pulse: 71 (07/04/18 0831)  Resp: 16 (07/04/18 0831)  BP: (!) 144/84 (07/04/18 0751)  SpO2: 98 % (07/04/18 0817) Vital Signs (24h Range):  Temp:  [97.5 °F (36.4 °C)-98.6 °F (37 °C)] 98.6 °F (37 °C)  Pulse:  [66-80] 71  Resp:  [16-20] 16  SpO2:  [97 %-100 %] 98 %  BP: (128-166)/(74-88) 144/84     Weight: 96.6 kg (212 lb 15.4 oz)  Body mass index is 28.88 kg/m².    Intake/Output Summary (Last 24 hours) at 07/04/18 1115  Last data filed at 07/04/18 1000   Gross per 24 hour   Intake             1176 ml   Output              900 ml   Net              276 ml      Physical Exam   Constitutional: He is oriented to person, place, and time. He appears well-developed and well-nourished.   HENT:   Head: Normocephalic and atraumatic.   Pulmonary/Chest: Breath sounds normal. No respiratory distress. He has no wheezes. He has no rales.   Neurological: He is alert and oriented to person, place, and time.   Vitals reviewed.      Significant Labs:   BMP:   Recent Labs  Lab 07/03/18  0714   GLU 84      K 4.0      CO2 32*   BUN 25*   CREATININE 1.1   CALCIUM 9.1     CBC: No results for input(s): WBC, HGB, HCT, PLT in the last 48 hours.    Significant Imaging:     Assessment/Plan:      * COPD with  acute exacerbation    Patient has a known history of COPD but also with sarcoidosis. CTA without evidence of central PE but subsegmental and distal could not be excluded. Also with small bilateral pleural effusions, diffuse ground glass opacities and cavitary lesions with mediastinal lymphadenopathy. Given known history of sarcoidosis, these are likely chronic, however [facility] (requested). Will add quantiferon gold while waiting for records to be sent. Hold off on CPAP as it caused negative effect on patient's symptoms. De-esc to prednisone 40 mg daily (he takes 10 mg at home), continue bronchodilators and empiric azithromycin + ceftriaxone, maintenance LABA/ICS and inhl anticholinergic, continue with duo-neb treatments. Supplemental O2 as needed for goal sats 88-93%. He is home 02 dependent.         Sarcoidosis    As addressed above; will continue his home regimen of prednisone. Patient has cavitary lesions upper lung zone- seen in granulomatous diseases such as sarcoid. Has been worked up for TB in the past- negative- so doubt TB.          History of prostate cancer    Stable; there are no acute issues.        Presence of permanent cardiac pacemaker    Stable; there are no acute issues.        History of sick sinus syndrome    Stable; there are no acute issues.        Acute on chronic respiratory failure with hypoxia    As addressed above.  Is on home 02.         COPD (chronic obstructive pulmonary disease)    As addressed above.        Hyperlipidemia    Patient is not on pravastatin        Essential hypertension    Patient's blood pressure is better-controlled; will continue home regimen of  metoprolol, and valsartan, and provide as-needed clonidine.        Elevated troponin    Patient's first two sets of troponins are flat with the first measurement being 0.151 and a repeat of 0.154.  This is likely due to demand.  I have reviewed the EKG and it reveals sinus tachycardia without  evidence of acute ischemia.           VTE Risk Mitigation         Ordered     IP VTE LOW RISK PATIENT  Once      06/28/18 2050        Will d/c to home.         Yonas Salazar MD  Department of Hospital Medicine   [facility]

## 2018-07-04 NOTE — DISCHARGE SUMMARY
[facility] Hospital Medicine  Discharge Summary      Patient Name: Levi Melendez  MRN: [id number]  Admission Date: 6/28/2018  Hospital Length of Stay: 6 days  Discharge Date and Time:  07/04/2018 11:20 AM  Attending Physician: Yonas Reyes MD   Discharging Provider: Yonas Reyes MD  Primary Care Provider: Tasha Brwon MD      HPI:   Mr. Levi Melendez is a 68 y.o. male with essential hypertension, hyperlipidemia (LDL unknown), COPD, chronic respiratory failure with hypoxia, sarcoidosis, history of sick sinus syndrome with permanent pacemaker in place, and history of prostate cancer who presents to [facility] ED with complaints of progressively worsening dyspnea for three weeks.  He reports that initially the shortness of breath would limit his mobility to one flight of stairs but it has progressively worsened to the point in the last couple of days that just maoing around in bed makes him dyspneic.  He feels fine at rest but does say that he is using his as-needed supplemental oxygen almost constantly now.  His dyspnea is associated with both wheezing and coughing that is occasionally productive of blood-tinged sputum; he does say that he has chest soreness only when he coughs.  He has subjective fevers and chills and reports that his appetite has been poor.  He has not had any sick contacts or recent travel.  He denies any lower extremity pain or swelling and has not experienced any palpitations or diaphoresis.      * No surgery found *      Hospital Course:   Mr Melendez presented with acute on chronic respiratory failure 2/2 COPD exacerbation. No clear etiology. CTA negative for central pulmonary embolism and small bilateral pleural effusions but with diffuse groundglass airspace opacities. Patient clinically improved with supplemental O2, solumedrol, empiric azithro/ceftriaxone and bronchodilators. He does have underlying sarcoidosis and CT also showed cavitary lesions in upper lobes  with scarring and mediastinal lymphadenopathy, which can be seen with sarcoidosis, TB and cancer. He reports to me he has been worked up for TB and was negative. [facility] and quant gold ordered (has had positive PPD in the past (with negative quant)). Patient clinically improved over the course of several days. Will d/c to home today. Slow steroid taper then resume his 5mg daily. Activity as tolerated. Diet- low NA. Follow up with PCP in one week      Consults:     No new Assessment & Plan notes have been filed under this hospital service since the last note was generated.  Service: Hospital Medicine    Final Active Diagnoses:    Diagnosis Date Noted POA    Sarcoidosis [D86.9] 06/28/2018 Yes     Chronic    Essential hypertension [I10] 06/29/2018 Yes     Chronic    Hyperlipidemia [E78.5] 06/29/2018 Yes     Chronic    COPD (chronic obstructive pulmonary disease) [J44.9] 06/29/2018 Yes     Chronic    History of sick sinus syndrome [Z86.79] 06/29/2018 Not Applicable     Chronic    Presence of permanent cardiac pacemaker [Z95.0] 06/29/2018 Yes     Chronic    History of prostate cancer [Z85.46] 06/29/2018 Not Applicable     Chronic      Problems Resolved During this Admission:    Diagnosis Date Noted Date Resolved POA    PRINCIPAL PROBLEM:  COPD with acute exacerbation [J44.1] 06/29/2018 07/04/2018 Yes    Elevated troponin [R74.8] 06/29/2018 07/04/2018 Yes    Acute on chronic respiratory failure with hypoxia [J96.21] 06/29/2018 07/04/2018 Yes       Discharged Condition: good    Disposition: Home or Self Care    Follow Up:  Follow-up Information     Andre David MD.    Specialty:  Internal Medicine  Contact information:  [address]  [facility] [address]  [phone]             Tasha Brown MD In 1 week.    Specialty:  Nephrology  Contact information:  [address] [facility] [place] [address] [phone]                 Patient Instructions:      Activity as tolerated         Significant Diagnostic Studies:    Pending Diagnostic Studies:     Procedure Component Value Units Date/Time    Quantiferon Gold TB [[id number]] Collected:  07/03/18 0714    Order Status:  Sent Lab Status:  In process Updated:  07/03/18 1433    Specimen:  Blood from Blood          Medications:  Reconciled Home Medications:      Medication List      CHANGE how you take these medications    * predniSONE 5 MG tablet  Commonly known as:  DELTASONE  Take 5 mg by mouth once daily.  What changed:  Another medication with the same name was added. Make sure you understand how and when to take each.     * predniSONE 20 MG tablet  Commonly known as:  DELTASONE  2 tablets po daily for 5 days 1 tablet po daily for 5 days  What changed:  You were already taking a medication with the same name, and this prescription was added. Make sure you understand how and when to take each.        * This list has 2 medication(s) that are the same as other medications prescribed for you. Read the directions carefully, and ask your doctor or other care provider to review them with you.            CONTINUE taking these medications    acetaminophen 500 MG tablet  Commonly known as:  TYLENOL  Take 500 mg by mouth every 6 (six) hours as needed.     albuterol 2.5 mg /3 mL (0.083 %) nebulizer solution  Commonly known as:  PROVENTIL  Take 2.5 mg by nebulization every 6 (six) hours as needed.     aspirin 81 MG Chew  Take 81 mg by mouth once daily.     metoprolol succinate 200 MG 24 hr tablet  Commonly known as:  TOPROL-XL  Take 100 mg by mouth once daily.     multivitamin with minerals tablet  Take 1 tablet by mouth once daily.     valsartan 40 MG tablet  Commonly known as:  DIOVAN  Take 20 mg by mouth once daily.        STOP taking these medications    amLODIPine 5 MG tablet  Commonly known as:  NORVASC     brimonidine 0.2% 0.2 % Drop  Commonly known as:  ALPHAGAN     latanoprost 0.005 % ophthalmic solution      lisinopril 5 MG tablet  Commonly known as:  PRINIVIL,ZESTRIL     pravastatin 80 MG tablet  Commonly known as:  PRAVACHOL     tacrolimus 0.03 % ointment  Commonly known as:  PROTOPIC            Indwelling Lines/Drains at time of discharge:   Lines/Drains/Airways     Airway                 Airway - Non-Surgical Nasal Cannula -- days                Time spent on the discharge of patient:  < 30  minutes  Patient was seen and examined on the date of discharge and determined to be suitable for discharge.         Yonas Salazar MD  Department of Hospital Medicine  [facility]

## 2018-07-05 NOTE — PHYSICIAN QUERY
PT Name: Levi Melendez  MR #: [id number]     Physician Query Form - Documentation Clarification      CDS/: Nenita Paz    RN CCDS           Contact information:[email or web address]    This form is a permanent document in the medical record.     Query Date: July 5, 2018    By submitting this query, we are merely seeking further clarification of documentation. Please utilize your independent clinical judgment when addressing the question(s) below.    The Medical record reflects the following:    Supporting Clinical Findings Location in Medical Record     of note troponin 0.151, possibly demand ischemia but will certainly need trending given hx of CP and SOB         ED physician note     Elevated troponin Patient's first two sets of troponins are flat with the first measurement being 0.151 and a repeat of 0.154. This is likely due to demand. I have reviewed the EKG and it reveals sinus tachycardia without evidence of acute ischemia.          H&P, PN 6/30-7/4                    Please clarify the diagnosis of demand ischemia.  Thank you.    Provider Use Only      [   x] Demand ischemia ruled in    [   ] Demand ischemia ruled out    [   ] Other_________________                                                                                                             [  ] Clinically undetermined

## 2018-07-05 NOTE — PT/OT/SLP DISCHARGE
Physical Therapy Discharge Summary    Name: Levi Melendez  MRN: [id number]   Principal Problem: COPD with acute exacerbation     Patient Discharged from acute Physical Therapy on 18.  Please refer to prior PT noted date on 18 for functional status.     Assessment:     Patient appropriate for care in another setting.    Objective:     GOALS:    Physical Therapy Goals     Not on file          Multidisciplinary Problems (Resolved)        Problem: Physical Therapy Goal    Goal Priority Disciplines Outcome Goal Variances Interventions   Physical Therapy Goal   (Resolved)     PT/OT, PT Outcome(s) achieved     Description:  Goals to be met by: 18    Patient will increase functional independence with mobility by performin. Sit to stand transfer with Modified [place]  2. Gait x500 feet with Modified [place] using Rolling Walker   3. Upper and lower extremity exercise program x30 reps per handout, with independence                       Reasons for Discontinuation of Therapy Services  Transfer to alternate level of care.      Plan:     Patient Discharged to: Home with Home Health Service.    Leah Dove, PT  2018

## 2018-07-06 LAB
MITOGEN IGNF BCKGRD COR BLD-ACNC: 0.05 IU/ML
MITOGEN NIL: 0.77 IU/ML
TB GOLD PLUS: NEGATIVE
TB1 - NIL: -0.01 IU/ML
TB2 - NIL: 0.01 IU/ML

## 2018-09-03 ENCOUNTER — HOSPITAL ENCOUNTER (EMERGENCY)
Facility: HOSPITAL | Age: 68
Discharge: HOME | End: 2018-09-03
Attending: EMERGENCY MEDICINE
Payer: MEDICARE

## 2018-09-03 VITALS
WEIGHT: 212 LBS | SYSTOLIC BLOOD PRESSURE: 143 MMHG | BODY MASS INDEX: 28.71 KG/M2 | TEMPERATURE: 98 F | HEART RATE: 68 BPM | HEIGHT: 72 IN | DIASTOLIC BLOOD PRESSURE: 85 MMHG | RESPIRATION RATE: 20 BRPM | OXYGEN SATURATION: 99 %

## 2018-09-03 DIAGNOSIS — J44.9 CHRONIC OBSTRUCTIVE PULMONARY DISEASE, UNSPECIFIED COPD TYPE: ICD-10-CM

## 2018-09-03 DIAGNOSIS — R05.9 COUGH: Primary | ICD-10-CM

## 2018-09-03 LAB
ALBUMIN SERPL BCP-MCNC: 4 G/DL
ALP SERPL-CCNC: 47 U/L
ALT SERPL W/O P-5'-P-CCNC: 29 U/L
ANION GAP SERPL CALC-SCNC: 12 MMOL/L
AST SERPL-CCNC: 19 U/L
BASOPHILS # BLD AUTO: 0.02 K/UL
BASOPHILS NFR BLD: 0.3 %
BILIRUB SERPL-MCNC: 0.5 MG/DL
BILIRUB UR QL STRIP: NEGATIVE
BUN SERPL-MCNC: 18 MG/DL
CALCIUM SERPL-MCNC: 9.7 MG/DL
CHLORIDE SERPL-SCNC: 104 MMOL/L
CLARITY UR: CLEAR
CO2 SERPL-SCNC: 23 MMOL/L
COLOR UR: ABNORMAL
CREAT SERPL-MCNC: 1.4 MG/DL
DIFFERENTIAL METHOD: ABNORMAL
EOSINOPHIL # BLD AUTO: 0.2 K/UL
EOSINOPHIL NFR BLD: 2.2 %
ERYTHROCYTE [DISTWIDTH] IN BLOOD BY AUTOMATED COUNT: 15.2 %
EST. GFR  (AFRICAN AMERICAN): >60 ML/MIN/1.73 M^2
EST. GFR  (NON AFRICAN AMERICAN): 52 ML/MIN/1.73 M^2
GLUCOSE SERPL-MCNC: 155 MG/DL
GLUCOSE UR QL STRIP: NEGATIVE
HCT VFR BLD AUTO: 43.8 %
HGB BLD-MCNC: 14.3 G/DL
HGB UR QL STRIP: NEGATIVE
KETONES UR QL STRIP: NEGATIVE
LEUKOCYTE ESTERASE UR QL STRIP: ABNORMAL
LYMPHOCYTES # BLD AUTO: 0.5 K/UL
LYMPHOCYTES NFR BLD: 6.7 %
MCH RBC QN AUTO: 28.3 PG
MCHC RBC AUTO-ENTMCNC: 32.6 G/DL
MCV RBC AUTO: 87 FL
MICROSCOPIC COMMENT: NORMAL
MONOCYTES # BLD AUTO: 0.5 K/UL
MONOCYTES NFR BLD: 6.4 %
NEUTROPHILS # BLD AUTO: 6.6 K/UL
NEUTROPHILS NFR BLD: 83.9 %
NITRITE UR QL STRIP: NEGATIVE
PH UR STRIP: 5 [PH] (ref 5–8)
PLATELET # BLD AUTO: 157 K/UL
PMV BLD AUTO: 11.1 FL
POTASSIUM SERPL-SCNC: 4.1 MMOL/L
PROT SERPL-MCNC: 8.3 G/DL
PROT UR QL STRIP: NEGATIVE
RBC # BLD AUTO: 5.05 M/UL
RBC #/AREA URNS HPF: 0 /HPF (ref 0–4)
SODIUM SERPL-SCNC: 139 MMOL/L
SP GR UR STRIP: 1.01 (ref 1–1.03)
SQUAMOUS #/AREA URNS HPF: NORMAL /HPF
URN SPEC COLLECT METH UR: ABNORMAL
UROBILINOGEN UR STRIP-ACNC: NEGATIVE EU/DL
WBC # BLD AUTO: 7.8 K/UL
WBC #/AREA URNS HPF: 3 /HPF (ref 0–5)

## 2018-09-03 PROCEDURE — 99284 EMERGENCY DEPT VISIT MOD MDM: CPT | Mod: 25

## 2018-09-03 PROCEDURE — 81000 URINALYSIS NONAUTO W/SCOPE: CPT

## 2018-09-03 PROCEDURE — 63600175 PHARM REV CODE 636 W HCPCS: Performed by: EMERGENCY MEDICINE

## 2018-09-03 PROCEDURE — 85025 COMPLETE CBC W/AUTO DIFF WBC: CPT

## 2018-09-03 PROCEDURE — 80053 COMPREHEN METABOLIC PANEL: CPT

## 2018-09-03 PROCEDURE — 93010 ELECTROCARDIOGRAM REPORT: CPT | Mod: ,,, | Performed by: INTERNAL MEDICINE

## 2018-09-03 PROCEDURE — 93005 ELECTROCARDIOGRAM TRACING: CPT

## 2018-09-03 RX ORDER — PREDNISONE 20 MG/1
40 TABLET ORAL
Status: COMPLETED | OUTPATIENT
Start: 2018-09-03 | End: 2018-09-03

## 2018-09-03 RX ADMIN — PREDNISONE 40 MG: 20 TABLET ORAL at 09:09

## 2018-09-04 ENCOUNTER — PES CALL (OUTPATIENT)
Dept: ADMINISTRATIVE | Facility: CLINIC | Age: 68
End: 2018-09-04

## 2018-09-04 NOTE — ED TRIAGE NOTES
Pt arrived to ED due to increased SOB, wheezing, and coughing up blood that began today. Pt is normally on 2 liters of oxygen per nasal cannula at home

## 2018-09-04 NOTE — ED PROVIDER NOTES
"Encounter Date: 9/3/2018    SCRIBE #1 NOTE: I, Marco Buckner, am scribing for, and in the presence of,  Florin Jernigan MD. I have scribed the following portions of the note - Other sections scribed: HPI, ROS, and PE.       History     Chief Complaint   Patient presents with    Shortness of Breath     hx of copd and sarcodosis of the lung,  reports coughing up blood earlier today, RA sats 94%, placed on o2 with EMS up to 100%      CC: Cough    HPI: This 68 y.o. male with history of sarcoidosis presents to the ED c/o cough with hemoptysis that began today. Patient was having a coughing fit before feeling "something stuck" in his chest and spitting up a palm-sized amount of blood. This happened twice prior to arrival. Numerous previous episodes. He is compliant with his regular rx. Denies recent illness. He is on 2 L home oxygen as needed.       The history is provided by the patient. No  was used.     Review of patient's allergies indicates:   Allergen Reactions    Simvastatin Other (See Comments)     Muscular pain      Valium [diazepam] Palpitations     Past Medical History:   Diagnosis Date    Cancer     Prostate    Glaucoma     H/O prostate cancer     s/p radioactive seed implants    HTN (hypertension)     Pulmonary mycobacterial infection     Renal calculi     Sarcoidosis of lung     Sick sinus syndrome     s/p pacemaker placement     Past Surgical History:   Procedure Laterality Date    CARDIAC CATHETERIZATION  8-22-13    normal cornary arteries, no pulmonary HTN    CARDIAC PACEMAKER PLACEMENT      EYE SURGERY      heart biopsy      no evidence of sarcoid    prostate seed implants       Family History   Problem Relation Age of Onset    Heart disease Mother     Diabetes Mother     Cancer Father         lung    Colon cancer Neg Hx      Social History     Tobacco Use    Smoking status: Never Smoker    Smokeless tobacco: Never Used   Substance Use Topics    Alcohol use: No "    Drug use: No     Review of Systems   Constitutional: Negative for chills, diaphoresis and fever.   HENT: Negative for rhinorrhea and sore throat.    Eyes: Negative for visual disturbance.   Respiratory: Positive for cough and shortness of breath.         (+) hemoptysis   Cardiovascular: Negative for chest pain.   Gastrointestinal: Negative for abdominal pain, constipation, diarrhea, nausea and vomiting.   Genitourinary: Negative for dysuria.   Neurological: Negative for headaches.       Physical Exam     Initial Vitals [09/03/18 2006]   BP Pulse Resp Temp SpO2   130/72 90 20 97.9 °F (36.6 °C) 100 %      MAP       --         Physical Exam    Nursing note and vitals reviewed.  Constitutional: He is not diaphoretic. No distress.   HENT:   Head: Normocephalic and atraumatic.   Mouth/Throat: Oropharynx is clear and moist.   Eyes: Conjunctivae and EOM are normal. Pupils are equal, round, and reactive to light. No scleral icterus.   Neck: Normal range of motion. Neck supple. No JVD present.   Cardiovascular: Normal rate, regular rhythm and intact distal pulses.   Pulmonary/Chest: No stridor. No respiratory distress.   Mild crackles heard to bilateral bases.   Abdominal: Soft. Bowel sounds are normal. He exhibits no distension. There is no tenderness.   Musculoskeletal: Normal range of motion. He exhibits no edema or tenderness.   Neurological: He is alert and oriented to person, place, and time. He has normal strength. No cranial nerve deficit.   Skin: Skin is warm and dry. No rash noted.   Psychiatric: He has a normal mood and affect.         ED Course   Procedures  Labs Reviewed   CBC W/ AUTO DIFFERENTIAL - Abnormal; Notable for the following components:       Result Value    RDW 15.2 (*)     Lymph # 0.5 (*)     Gran% 83.9 (*)     Lymph% 6.7 (*)     All other components within normal limits   COMPREHENSIVE METABOLIC PANEL - Abnormal; Notable for the following components:    Glucose 155 (*)     Alkaline Phosphatase 47  (*)     eGFR if non  52 (*)     All other components within normal limits   URINALYSIS, REFLEX TO URINE CULTURE - Abnormal; Notable for the following components:    Leukocytes, UA Trace (*)     All other components within normal limits    Narrative:     Preferred Collection Type->Urine, Clean Catch   URINALYSIS MICROSCOPIC    Narrative:     Preferred Collection Type->Urine, Clean Catch     EKG Readings: (Independently Interpreted)   Initial Reading: No STEMI. Previous EKG: Compared with most recent EKG Rhythm: Normal Sinus Rhythm. Heart Rate: 77. Ectopy: No Ectopy. Clinical Impression: Normal Sinus Rhythm       Imaging Results          X-Ray Chest AP Portable (Final result)  Result time 09/03/18 21:30:18    Final result by Osiel Vaughan MD (09/03/18 21:30:18)                 Impression:      Stable abnormal appearance of the lungs.      Electronically signed by: Osiel Vaughan MD  Date:    09/03/2018  Time:    21:30             Narrative:    EXAMINATION:  XR CHEST AP PORTABLE    CLINICAL HISTORY:  cough;    TECHNIQUE:  Single frontal view of the chest was performed.    COMPARISON:  Chest radiograph and CTA chest from 06/28/2018.    FINDINGS:  Redemonstration of multifocal lucencies consistent with cavitary lesions involving the bilateral upper lobes as seen on previous CTA chest.  Chronic lung changes and scarring with patchy opacity is seen within both lungs.  No significant change from prior exam.  No evidence of new focal consolidative process, pneumothorax, or large effusion.  Left-sided pacer device is seen.  Heart is stable in size.  No acute osseous abnormality identified.                                 Medical Decision Making:   History:   Old Records Summarized: records from clinic visits and records from previous admission(s).  Initial Assessment:   Pt felt phlegm stuck in airway as he began to cough and could not clear it, became very anxious with coughing spell reports two episodes  of small amount of hemoptysis. Now symptoms all gone and no complaints.  Will check CXR, basic labs EKG, reassess  ED Management:  Recheck stable symptoms resolved, NO further episodes of hemoptysis. NO UTI. D/C home f/u with PCP            Scribe Attestation:   Scribe #1: I performed the above scribed service and the documentation accurately describes the services I performed. I attest to the accuracy of the note.    Attending Attestation:           Physician Attestation for Scribe:  Physician Attestation Statement for Scribe #1: I, Florin Jernigan MD, reviewed documentation, as scribed by Marco Buckner in my presence, and it is both accurate and complete.                    Clinical Impression:   The primary encounter diagnosis was Cough. A diagnosis of Chronic obstructive pulmonary disease, unspecified COPD type was also pertinent to this visit.                             Florin Jernigan MD  09/03/18 0303

## 2020-09-29 ENCOUNTER — OFFICE VISIT (OUTPATIENT)
Dept: PODIATRY | Facility: CLINIC | Age: 70
End: 2020-09-29
Payer: OTHER GOVERNMENT

## 2020-09-29 VITALS
BODY MASS INDEX: 28.71 KG/M2 | HEIGHT: 72 IN | WEIGHT: 212 LBS | DIASTOLIC BLOOD PRESSURE: 88 MMHG | SYSTOLIC BLOOD PRESSURE: 134 MMHG

## 2020-09-29 DIAGNOSIS — R20.2 PARESTHESIA: Primary | ICD-10-CM

## 2020-09-29 DIAGNOSIS — R73.03 PRE-DIABETES: ICD-10-CM

## 2020-09-29 PROCEDURE — 99203 OFFICE O/P NEW LOW 30 MIN: CPT | Mod: S$PBB,,, | Performed by: PODIATRIST

## 2020-09-29 PROCEDURE — 99213 OFFICE O/P EST LOW 20 MIN: CPT | Mod: PBBFAC,PO | Performed by: PODIATRIST

## 2020-09-29 PROCEDURE — 99999 PR PBB SHADOW E&M-EST. PATIENT-LVL III: ICD-10-PCS | Mod: PBBFAC,,, | Performed by: PODIATRIST

## 2020-09-29 PROCEDURE — 99999 PR PBB SHADOW E&M-EST. PATIENT-LVL III: CPT | Mod: PBBFAC,,, | Performed by: PODIATRIST

## 2020-09-29 PROCEDURE — 99203 PR OFFICE/OUTPT VISIT, NEW, LEVL III, 30-44 MIN: ICD-10-PCS | Mod: S$PBB,,, | Performed by: PODIATRIST

## 2020-09-29 NOTE — PATIENT INSTRUCTIONS
Shoe recommendations: (try [email or web address], [email or web address] , [email or web address], or shoes.com for discounted prices) you can visit DSW shoes in [place] as well    Asics (GT 1000 or gel foundations), new balance, kyle (stabil c3),  Aleks (transcend), vionic, propet, Hoka (One)  (tennis shoe)    soft brand, clarks, crocs, naot, aerosoles, naturalizers, SAS, ecco, ortiz, callum chapin (dress shoes)    Vionic, volitiles, burkenstocks, fitflops, naot, propet (sandals)    Nike comfort thong sandals, crocs,dr comfort  (house shoes)

## 2020-09-29 NOTE — LETTER
September 30, 2020      Patricia Jamison DPM  [address] [facility] [place] [address]           [facility]  [address]  [place] [address]  Phone: [phone]          Patient: Levi Melendez   MR Number: [id number]   YOB: 1950   Date of Visit: 9/29/2020       Dear Dr. Patricia Jamison:    Thank you for referring Levi Melendez to me for evaluation. Attached you will find relevant portions of my assessment and plan of care.    If you have questions, please do not hesitate to call me. I look forward to following Levi Melendez along with you.    Sincerely,    Camilla Savage DPM    Enclosure  CC:  No Recipients    If you would like to receive this communication electronically, please contact [email or web address] or [phone] to request more information on [employer] Link access.    For providers and/or their staff who would like to refer a patient to [facility], please contact us through our one-stop-shop provider referral line, [facility], at [phone].    If you feel you have received this communication in error or would no longer like to receive these types of communications, please e-mail [email or web address]

## 2020-09-30 NOTE — PROGRESS NOTES
Subjective:      Patient ID: Levi Melendez is a 70 y.o. male.    Chief Complaint: Foot Problem (Bilateral foot pain ) and Foot Pain    Levi is a 70 y.o. male who presents to the clinic upon referral from Dr. Jamison  for evaluation and treatment of diabetic feet. Levi has a past medical history of Cancer, Glaucoma, H/O prostate cancer, HTN (hypertension), Pulmonary mycobacterial infection, Renal calculi, Sarcoidosis of lung, and Sick sinus syndrome. Reports B/L feet feel tight at the end of the day.       PCP: Primary Doctor No    Date Last Seen by PCP: per above    Current shoe gear: Tennis shoes    No results found for: HGBA1C          Review of Systems   Constitution: Negative for chills.   Cardiovascular: Negative for chest pain and claudication.   Respiratory: Negative for cough.    Skin: Positive for color change, dry skin and nail changes.   Musculoskeletal: Positive for joint pain.   Gastrointestinal: Negative for nausea.   Neurological: Positive for paresthesias. Negative for numbness.   Psychiatric/Behavioral: The patient is not nervous/anxious.            Objective:      Physical Exam  Constitutional:       Appearance: He is well-developed.      Comments: Oriented to time, place, and person.   Cardiovascular:      Comments: DP and PT pulses are palpable bilaterally. 3 sec capillary refill time and toes and feet are warm to touch proximally .  There is  hair growth on the feet and toes b/l. There is no edema b/l. No spider veins or varicosities present b/l.     Musculoskeletal:      Comments: Equinus noted b/l ankles with < 10 deg DF noted. MMT 5/5 in DF/PF/Inv/Ev resistance with no reproduction of pain in any direction. Passive range of motion of ankle and pedal joints is painless b/l.     Feet:      Right foot:      Skin integrity: No callus or dry skin.      Left foot:      Skin integrity: No callus or dry skin.   Lymphadenopathy:      Comments: Negative lymphadenopathy bilateral popliteal fossa and  tarsal tunnel.   Skin:     Comments: No open lesions, lacerations or wounds noted.Interdigital spaces clean, dry and intact b/l. No erythema noted to b/l foot.  Nails normal color and trophic qualities.     Neurological:      Mental Status: He is alert.      Comments: Light touch, proprioception, and sharp/dull sensation are all intact bilaterally. Protective threshold with the [place]-Wienstein monofilament is intact bilaterally.      Subjective paresthesias with no clearly identifiable source or trigger.      Psychiatric:         Behavior: Behavior is cooperative.               Assessment:       Encounter Diagnoses   Name Primary?    Paresthesia Yes    Pre-diabetes          Plan:       Levi was seen today for foot problem and foot pain.    Diagnoses and all orders for this visit:    Paresthesia  -     Ambulatory referral/consult to Neurology; Future    Pre-diabetes      I counseled the patient on his conditions, their implications and medical management.      Neurology referral to evaluate paresthesia B/L LE    Discussed conservative treatment with shoes of adequate dimensions, material, and style to alleviate symptoms and delay or prevent surgical intervention.    - Shoe inspection. Diabetic Foot Education. Patient reminded of the importance of good nutrition and blood sugar control to help prevent podiatric complications of diabetes. Patient instructed on proper foot hygeine. We discussed wearing proper shoe gear, daily foot inspections, never walking without protective shoe gear, caution putting sharp instruments to feet     - Discussed DM foot care:  Wear comfortable, proper fitting shoes. Wash feet daily. Dry well. After drying, apply moisturizer to feet (no lotion to webspaces). Inspect feet daily for skin breaks, blisters, swelling, or redness. Wear cotton socks (preferably white)  Change socks every day. Do NOT walk barefoot. Do NOT use heating pads or warm/hot water soaks     F/u DM foot exam one year  sooner PRN.

## 2021-05-14 ENCOUNTER — TELEPHONE (OUTPATIENT)
Dept: OPHTHALMOLOGY | Facility: CLINIC | Age: 71
End: 2021-05-14

## 2021-08-16 ENCOUNTER — OFFICE VISIT (OUTPATIENT)
Dept: OPHTHALMOLOGY | Facility: CLINIC | Age: 71
End: 2021-08-16
Payer: OTHER GOVERNMENT

## 2021-08-16 DIAGNOSIS — H54.10 BLINDNESS AND LOW VISION: ICD-10-CM

## 2021-08-16 DIAGNOSIS — Z98.83 GLAUCOMA FILTERING BLEB OF BOTH EYES: ICD-10-CM

## 2021-08-16 DIAGNOSIS — H04.123 DRY EYE SYNDROME OF BOTH EYES: ICD-10-CM

## 2021-08-16 DIAGNOSIS — H40.1133 PRIMARY OPEN ANGLE GLAUCOMA (POAG) OF BOTH EYES, SEVERE STAGE: Primary | ICD-10-CM

## 2021-08-16 DIAGNOSIS — H25.13 NUCLEAR SCLEROTIC CATARACT OF BOTH EYES: ICD-10-CM

## 2021-08-16 PROCEDURE — 99204 OFFICE O/P NEW MOD 45 MIN: CPT | Mod: S$PBB,,, | Performed by: OPHTHALMOLOGY

## 2021-08-16 PROCEDURE — 99213 OFFICE O/P EST LOW 20 MIN: CPT | Mod: PBBFAC,PO | Performed by: OPHTHALMOLOGY

## 2021-08-16 PROCEDURE — 92020 PR SPECIAL EYE EVAL,GONIOSCOPY: ICD-10-PCS | Mod: S$PBB,,, | Performed by: OPHTHALMOLOGY

## 2021-08-16 PROCEDURE — 92083 HUMPHREY VISUAL FIELD - OU - BOTH EYES: ICD-10-PCS | Mod: 26,S$PBB,, | Performed by: OPHTHALMOLOGY

## 2021-08-16 PROCEDURE — 76514 ECHO EXAM OF EYE THICKNESS: CPT | Mod: PBBFAC,PO | Performed by: OPHTHALMOLOGY

## 2021-08-16 PROCEDURE — 92020 GONIOSCOPY: CPT | Mod: PBBFAC,PO | Performed by: OPHTHALMOLOGY

## 2021-08-16 PROCEDURE — 92250 FUNDUS PHOTOGRAPHY W/I&R: CPT | Mod: PBBFAC,PO | Performed by: OPHTHALMOLOGY

## 2021-08-16 PROCEDURE — 99999 PR PBB SHADOW E&M-EST. PATIENT-LVL III: CPT | Mod: PBBFAC,,, | Performed by: OPHTHALMOLOGY

## 2021-08-16 PROCEDURE — 76514 PR  US, EYE, FOR CORNEAL THICKNESS: ICD-10-PCS | Mod: 26,S$PBB,, | Performed by: OPHTHALMOLOGY

## 2021-08-16 PROCEDURE — 76514 ECHO EXAM OF EYE THICKNESS: CPT | Mod: 26,S$PBB,, | Performed by: OPHTHALMOLOGY

## 2021-08-16 PROCEDURE — 92020 GONIOSCOPY: CPT | Mod: S$PBB,,, | Performed by: OPHTHALMOLOGY

## 2021-08-16 PROCEDURE — 99999 PR PBB SHADOW E&M-EST. PATIENT-LVL III: ICD-10-PCS | Mod: PBBFAC,,, | Performed by: OPHTHALMOLOGY

## 2021-08-16 PROCEDURE — 99204 PR OFFICE/OUTPT VISIT, NEW, LEVL IV, 45-59 MIN: ICD-10-PCS | Mod: S$PBB,,, | Performed by: OPHTHALMOLOGY

## 2021-08-16 PROCEDURE — 92083 EXTENDED VISUAL FIELD XM: CPT | Mod: PBBFAC,PO | Performed by: OPHTHALMOLOGY

## 2021-08-16 PROCEDURE — 92250 COLOR FUNDUS PHOTOGRAPHY - OU - BOTH EYES: ICD-10-PCS | Mod: 26,S$PBB,, | Performed by: OPHTHALMOLOGY

## 2021-08-16 RX ORDER — ERYTHROMYCIN 5 MG/G
OINTMENT OPHTHALMIC
Status: ON HOLD | COMMUNITY
Start: 2019-11-12 | End: 2023-07-27 | Stop reason: ALTCHOICE

## 2021-08-16 RX ORDER — BRIMONIDINE TARTRATE 2 MG/ML
1 SOLUTION/ DROPS OPHTHALMIC 3 TIMES DAILY
Qty: 15 ML | Refills: 3 | Status: SHIPPED | OUTPATIENT
Start: 2021-08-16 | End: 2022-03-21 | Stop reason: SDUPTHER

## 2021-08-16 RX ORDER — GABAPENTIN 100 MG/1
CAPSULE ORAL
COMMUNITY
Start: 2020-09-28

## 2021-08-16 RX ORDER — TAMSULOSIN HYDROCHLORIDE 0.4 MG/1
CAPSULE ORAL
COMMUNITY
Start: 2020-09-28

## 2021-11-15 ENCOUNTER — OFFICE VISIT (OUTPATIENT)
Dept: OPHTHALMOLOGY | Facility: CLINIC | Age: 71
End: 2021-11-15
Payer: OTHER GOVERNMENT

## 2021-11-15 DIAGNOSIS — H25.13 NUCLEAR SCLEROTIC CATARACT OF BOTH EYES: ICD-10-CM

## 2021-11-15 DIAGNOSIS — H04.123 DRY EYE SYNDROME OF BOTH EYES: ICD-10-CM

## 2021-11-15 DIAGNOSIS — H40.1133 PRIMARY OPEN ANGLE GLAUCOMA (POAG) OF BOTH EYES, SEVERE STAGE: Primary | ICD-10-CM

## 2021-11-15 DIAGNOSIS — Z98.83 GLAUCOMA FILTERING BLEB OF BOTH EYES: ICD-10-CM

## 2021-11-15 DIAGNOSIS — H54.10 BLINDNESS AND LOW VISION: ICD-10-CM

## 2021-11-15 PROCEDURE — 99999 PR PBB SHADOW E&M-EST. PATIENT-LVL III: CPT | Mod: PBBFAC,,, | Performed by: OPHTHALMOLOGY

## 2021-11-15 PROCEDURE — 99214 OFFICE O/P EST MOD 30 MIN: CPT | Mod: S$PBB,,, | Performed by: OPHTHALMOLOGY

## 2021-11-15 PROCEDURE — 99999 PR PBB SHADOW E&M-EST. PATIENT-LVL III: ICD-10-PCS | Mod: PBBFAC,,, | Performed by: OPHTHALMOLOGY

## 2021-11-15 PROCEDURE — 99214 PR OFFICE/OUTPT VISIT, EST, LEVL IV, 30-39 MIN: ICD-10-PCS | Mod: S$PBB,,, | Performed by: OPHTHALMOLOGY

## 2021-11-15 PROCEDURE — 99213 OFFICE O/P EST LOW 20 MIN: CPT | Mod: PBBFAC,PO | Performed by: OPHTHALMOLOGY

## 2021-11-15 RX ORDER — LATANOPROST 50 UG/ML
1 SOLUTION/ DROPS OPHTHALMIC NIGHTLY
Status: ON HOLD | COMMUNITY
End: 2023-07-27

## 2022-03-21 ENCOUNTER — OFFICE VISIT (OUTPATIENT)
Dept: OPHTHALMOLOGY | Facility: CLINIC | Age: 72
End: 2022-03-21
Payer: OTHER GOVERNMENT

## 2022-03-21 DIAGNOSIS — Z98.83 GLAUCOMA FILTERING BLEB OF BOTH EYES: ICD-10-CM

## 2022-03-21 DIAGNOSIS — H54.10 BLINDNESS AND LOW VISION: ICD-10-CM

## 2022-03-21 DIAGNOSIS — H40.1133 PRIMARY OPEN ANGLE GLAUCOMA (POAG) OF BOTH EYES, SEVERE STAGE: Primary | ICD-10-CM

## 2022-03-21 DIAGNOSIS — H04.123 DRY EYE SYNDROME OF BOTH EYES: ICD-10-CM

## 2022-03-21 DIAGNOSIS — H40.1133 PRIMARY OPEN ANGLE GLAUCOMA (POAG) OF BOTH EYES, SEVERE STAGE: ICD-10-CM

## 2022-03-21 DIAGNOSIS — H25.13 NUCLEAR SCLEROTIC CATARACT OF BOTH EYES: ICD-10-CM

## 2022-03-21 PROCEDURE — 92133 POSTERIOR SEGMENT OCT OPTIC NERVE(OCULAR COHERENCE TOMOGRAPHY) - OU - BOTH EYES: ICD-10-PCS | Mod: 26,S$PBB,, | Performed by: OPHTHALMOLOGY

## 2022-03-21 PROCEDURE — 99214 PR OFFICE/OUTPT VISIT, EST, LEVL IV, 30-39 MIN: ICD-10-PCS | Mod: S$PBB,,, | Performed by: OPHTHALMOLOGY

## 2022-03-21 PROCEDURE — 99214 OFFICE O/P EST MOD 30 MIN: CPT | Mod: S$PBB,,, | Performed by: OPHTHALMOLOGY

## 2022-03-21 PROCEDURE — 99213 OFFICE O/P EST LOW 20 MIN: CPT | Mod: PBBFAC,PO | Performed by: OPHTHALMOLOGY

## 2022-03-21 PROCEDURE — 99999 PR PBB SHADOW E&M-EST. PATIENT-LVL III: CPT | Mod: PBBFAC,,, | Performed by: OPHTHALMOLOGY

## 2022-03-21 PROCEDURE — 92133 CPTRZD OPH DX IMG PST SGM ON: CPT | Mod: PBBFAC,PO | Performed by: OPHTHALMOLOGY

## 2022-03-21 PROCEDURE — 99999 PR PBB SHADOW E&M-EST. PATIENT-LVL III: ICD-10-PCS | Mod: PBBFAC,,, | Performed by: OPHTHALMOLOGY

## 2022-03-21 RX ORDER — BRIMONIDINE TARTRATE 2 MG/ML
1 SOLUTION/ DROPS OPHTHALMIC 2 TIMES DAILY
Qty: 15 ML | Refills: 6 | Status: SHIPPED | OUTPATIENT
Start: 2022-03-21 | End: 2023-07-13

## 2022-03-21 NOTE — PROGRESS NOTES
HPI     DLS: 11/15/2021  Pt is not taking Simbrinza-states it makes him feel ill    POAG   Multiple Trabs- OS x's 2, OD x's 1     Latanoprost QHS OU     Last edited by Kristine Bowman MA on 3/21/2022  2:08 PM. (History)            Assessment /Plan     For exam results, see Encounter Report.    Primary open angle glaucoma (POAG) of both eyes, severe stage  -     Posterior Segment OCT Optic Nerve- Both eyes    Nuclear sclerotic cataract of both eyes    Glaucoma filtering bleb of both eyes    Dry eye syndrome of both eyes    Blindness and low vision      [facility]  Pul Sarcoid O2 dependent    Patient with wife    Advanced POAG  [place]      CCT  444 // 443    Low teens --> achieved    Both eyes --> CSM --> [facility]  Xal q hs  Simbrinza BID --> GI Upset --> Trial Alphagan BID      SP Trabs  OD x 1  OS x 2    No BB --> Pulm Sarcoid 02 dependent & Oral steroids    NSC OU  CE PRN --> discussed --> patient considering [facility]   Observe    Blebitis precautions were discussed with patient/family and to call and return to clinic immediately for redness, pain, decreasing vision, lid swelling and discharge in eye with previous glaucoma surgery.      Plan  RTC 2 months IOP & Adherence & alphagan BID OU --> [facility] fu with [facility]  RTC sooner prn with good understanding

## 2022-06-20 ENCOUNTER — OFFICE VISIT (OUTPATIENT)
Dept: OPHTHALMOLOGY | Facility: CLINIC | Age: 72
End: 2022-06-20
Payer: OTHER GOVERNMENT

## 2022-06-20 DIAGNOSIS — H04.123 DRY EYE SYNDROME OF BOTH EYES: ICD-10-CM

## 2022-06-20 DIAGNOSIS — Z98.83 GLAUCOMA FILTERING BLEB OF BOTH EYES: ICD-10-CM

## 2022-06-20 DIAGNOSIS — H54.10 BLINDNESS AND LOW VISION: ICD-10-CM

## 2022-06-20 DIAGNOSIS — H40.1133 PRIMARY OPEN ANGLE GLAUCOMA (POAG) OF BOTH EYES, SEVERE STAGE: Primary | ICD-10-CM

## 2022-06-20 DIAGNOSIS — H25.13 NUCLEAR SCLEROTIC CATARACT OF BOTH EYES: ICD-10-CM

## 2022-06-20 PROCEDURE — 99999 PR PBB SHADOW E&M-EST. PATIENT-LVL III: CPT | Mod: PBBFAC,,, | Performed by: OPHTHALMOLOGY

## 2022-06-20 PROCEDURE — 99214 OFFICE O/P EST MOD 30 MIN: CPT | Mod: S$PBB,,, | Performed by: OPHTHALMOLOGY

## 2022-06-20 PROCEDURE — 99999 PR PBB SHADOW E&M-EST. PATIENT-LVL III: ICD-10-PCS | Mod: PBBFAC,,, | Performed by: OPHTHALMOLOGY

## 2022-06-20 PROCEDURE — 99214 PR OFFICE/OUTPT VISIT, EST, LEVL IV, 30-39 MIN: ICD-10-PCS | Mod: S$PBB,,, | Performed by: OPHTHALMOLOGY

## 2022-06-20 PROCEDURE — 99213 OFFICE O/P EST LOW 20 MIN: CPT | Mod: PBBFAC,PO | Performed by: OPHTHALMOLOGY

## 2022-06-20 NOTE — PROGRESS NOTES
HPI     DLS: 3/21/22    Pt here for 3 month IOP check.  Pt states his eyes are dim.  [facility] and pt tried to call the office   and never received a call back. Pt states Alphagan and Xalatan makes his   chest and stomach irritated.  Pt stopped Alphagan about 3 days after   starting it.     POAG   Multiple Trabs- OS x's 2, OD x's 1     Latanoprost QHS OU   Alphagan BID OU-(stopped taking it)    Last edited by Mary Grace Pack MA on 6/20/2022  2:41 PM.   (History)            Assessment /Plan     For exam results, see Encounter Report.    Primary open angle glaucoma (POAG) of both eyes, severe stage    Blindness and low vision    Dry eye syndrome of both eyes    Glaucoma filtering bleb of both eyes    Nuclear sclerotic cataract of both eyes      [facility]  Pul Sarcoid O2 dependent    Advanced POAG  [place]    CCT  444 // 443    Low teens --> achieved    Both eyes --> CSM [facility]  Xal q hs  Simbrinza BID --> GI Upset --> Alphagan BID --> GI Upset --> patient stopped few days ago & feels better     SP Trabs  OD x 1  OS x 2    No BB --> Pulm Sarcoid 02 dependent & Oral steroids    NSC OU  CE PRN --> discussed --> [facility] --> now requesting [facility]  Observe    Blebitis precautions were discussed with patient/family and to call and return to clinic immediately for redness, pain, decreasing vision, lid swelling and discharge in eye with previous glaucoma surgery.      Plan  RTC 2 months IOP & Adherence --> using Xal q day c food  RTC sooner prn with good understanding

## 2022-08-15 ENCOUNTER — OFFICE VISIT (OUTPATIENT)
Dept: OPHTHALMOLOGY | Facility: CLINIC | Age: 72
End: 2022-08-15
Payer: OTHER GOVERNMENT

## 2022-08-15 DIAGNOSIS — H25.13 NUCLEAR SCLEROTIC CATARACT OF BOTH EYES: ICD-10-CM

## 2022-08-15 DIAGNOSIS — Z98.83 GLAUCOMA FILTERING BLEB OF BOTH EYES: ICD-10-CM

## 2022-08-15 DIAGNOSIS — H54.10 BLINDNESS AND LOW VISION: ICD-10-CM

## 2022-08-15 DIAGNOSIS — H04.123 DRY EYE SYNDROME OF BOTH EYES: ICD-10-CM

## 2022-08-15 DIAGNOSIS — H40.1133 PRIMARY OPEN ANGLE GLAUCOMA (POAG) OF BOTH EYES, SEVERE STAGE: Primary | ICD-10-CM

## 2022-08-15 PROCEDURE — 99214 PR OFFICE/OUTPT VISIT, EST, LEVL IV, 30-39 MIN: ICD-10-PCS | Mod: S$PBB,,, | Performed by: OPHTHALMOLOGY

## 2022-08-15 PROCEDURE — 99214 OFFICE O/P EST MOD 30 MIN: CPT | Mod: S$PBB,,, | Performed by: OPHTHALMOLOGY

## 2022-08-15 PROCEDURE — 99999 PR PBB SHADOW E&M-EST. PATIENT-LVL III: CPT | Mod: PBBFAC,,, | Performed by: OPHTHALMOLOGY

## 2022-08-15 PROCEDURE — 99213 OFFICE O/P EST LOW 20 MIN: CPT | Mod: PBBFAC,PO | Performed by: OPHTHALMOLOGY

## 2022-08-15 PROCEDURE — 99999 PR PBB SHADOW E&M-EST. PATIENT-LVL III: ICD-10-PCS | Mod: PBBFAC,,, | Performed by: OPHTHALMOLOGY

## 2022-08-15 RX ORDER — CHOLECALCIFEROL (VITAMIN D3) 25 MCG
25 TABLET ORAL
COMMUNITY
Start: 2021-09-13

## 2022-08-15 NOTE — PROGRESS NOTES
HPI     Glaucoma      Additional comments: 2 mon iop chk              Comments       POAG   Multiple Trabs- OS x's 2, OD x's 1     Latanoprost QHS OU             Last edited by Moise Zaragoza on 8/15/2022  1:26 PM. (History)            Assessment /Plan     For exam results, see Encounter Report.    Primary open angle glaucoma (POAG) of both eyes, severe stage    Nuclear sclerotic cataract of both eyes    Glaucoma filtering bleb of both eyes    Dry eye syndrome of both eyes    Blindness and low vision      [facility]  Pul Sarcoid O2 dependent    Advanced POAG  [place]    CCT  444 // 443    Low teens --> achieved    Both eyes --> CSM [facility]  Xal q hs  Simbrinza BID --> Not using // GI Upset  Alphagan BID --> Not using // GI Upset     No BB --> Pulm Sarcoid 02 dependent & Oral steroids    SP Trabs  OD x 1  OS x 2      NSC OU  CE PRN --> now requesting [facility]  Observe  Discussed IOP control following CE IOL  May require further glc care    Blebitis precautions were discussed with patient/family and to call and return to clinic immediately for redness, pain, decreasing vision, lid swelling and discharge in eye with previous glaucoma surgery.      Plan  RTC Dr JANES Sanchez --> consider CE IOL OU  RTC 4 months IOP & Adherence --> p CE IOL OU  RTC sooner prn with good understanding

## 2022-12-19 ENCOUNTER — OFFICE VISIT (OUTPATIENT)
Dept: OPHTHALMOLOGY | Facility: CLINIC | Age: 72
End: 2022-12-19
Payer: OTHER GOVERNMENT

## 2022-12-19 DIAGNOSIS — H40.1133 PRIMARY OPEN ANGLE GLAUCOMA (POAG) OF BOTH EYES, SEVERE STAGE: Primary | ICD-10-CM

## 2022-12-19 DIAGNOSIS — H25.13 NUCLEAR SCLEROTIC CATARACT OF BOTH EYES: ICD-10-CM

## 2022-12-19 DIAGNOSIS — Z98.83 GLAUCOMA FILTERING BLEB OF BOTH EYES: ICD-10-CM

## 2022-12-19 DIAGNOSIS — H04.123 DRY EYE SYNDROME OF BOTH EYES: ICD-10-CM

## 2022-12-19 DIAGNOSIS — H54.10 BLINDNESS AND LOW VISION: ICD-10-CM

## 2022-12-19 PROCEDURE — 99214 OFFICE O/P EST MOD 30 MIN: CPT | Mod: S$PBB,,, | Performed by: OPHTHALMOLOGY

## 2022-12-19 PROCEDURE — 99999 PR PBB SHADOW E&M-EST. PATIENT-LVL III: CPT | Mod: PBBFAC,,, | Performed by: OPHTHALMOLOGY

## 2022-12-19 PROCEDURE — 99999 PR PBB SHADOW E&M-EST. PATIENT-LVL III: ICD-10-PCS | Mod: PBBFAC,,, | Performed by: OPHTHALMOLOGY

## 2022-12-19 PROCEDURE — 99214 PR OFFICE/OUTPT VISIT, EST, LEVL IV, 30-39 MIN: ICD-10-PCS | Mod: S$PBB,,, | Performed by: OPHTHALMOLOGY

## 2022-12-19 PROCEDURE — 99213 OFFICE O/P EST LOW 20 MIN: CPT | Mod: PBBFAC,PO | Performed by: OPHTHALMOLOGY

## 2022-12-19 RX ORDER — FLUOCINOLONE ACETONIDE 0.11 MG/ML
OIL TOPICAL
COMMUNITY
Start: 2022-10-17

## 2022-12-19 RX ORDER — TACROLIMUS 1 MG/G
OINTMENT TOPICAL
COMMUNITY
Start: 2022-10-05

## 2022-12-19 RX ORDER — LINEZOLID 600 MG/1
600 TABLET, FILM COATED ORAL
COMMUNITY
Start: 2022-12-01 | End: 2023-07-11

## 2022-12-19 RX ORDER — KETOCONAZOLE 20 MG/G
CREAM TOPICAL
COMMUNITY
Start: 2022-10-05

## 2022-12-19 RX ORDER — AZITHROMYCIN 250 MG/1
250 TABLET, FILM COATED ORAL
COMMUNITY
Start: 2022-12-01 | End: 2023-07-11

## 2022-12-19 NOTE — PROGRESS NOTES
HPI     Glaucoma     Additional comments: 4 mon iop chk           Comments      POAG   Multiple Trabs- OS x's 2, OD x's 1     Latanoprost QHS OU             Last edited by Moise Zaragoza on 12/19/2022  2:28 PM.            Assessment /Plan     For exam results, see Encounter Report.    Primary open angle glaucoma (POAG) of both eyes, severe stage    Blindness and low vision    Dry eye syndrome of both eyes    Glaucoma filtering bleb of both eyes    Nuclear sclerotic cataract of both eyes      [facility]  Pul Sarcoid O2 dependent    Advanced POAG  [place]    CCT  444 // 443    Low teens --> achieved    Both eyes --> good adherence --> CSM [facility]  Xal q hs  Simbrinza BID --> Not using // GI Upset  Alphagan BID --> Not using // GI Upset     No BB --> Pulm Sarcoid 02 dependent & Oral steroids    SP Trabs  OD x 1  OS x 2      NSC OU  CE PRN --> discussed unclear Px --> [facility]  --> now [facility]  Re-Discussed IOP control following CE IOL  May require further glc care as discussed    Blebitis precautions were discussed with patient/family and to call and return to clinic immediately for redness, pain, decreasing vision, lid swelling and discharge in eye with previous glaucoma surgery.      Plan  RTC Dr JANES Sanchez --> consider CE IOL OU  RTC 4 months IOP & Adherence --> p CE IOL OU --> DFE & OCT RNFL  RTC sooner prn with good understanding

## 2023-03-15 ENCOUNTER — TELEPHONE (OUTPATIENT)
Dept: OPHTHALMOLOGY | Facility: CLINIC | Age: 73
End: 2023-03-15
Payer: OTHER GOVERNMENT

## 2023-03-15 NOTE — TELEPHONE ENCOUNTER
----- Message from Darcy Leblanc sent at 3/15/2023  1:13 PM CDT -----  Contact: Pt wife  Pt wife  is requesting a callback in regards to appt today @2:00 pm. Pt may need to reschedule due to transportation arriving late. Please adv pt if they should reschedule.      Confirmed contact below:   Contact Name:Pt wife   Phone Number: [phone] or [phone]

## 2023-05-17 ENCOUNTER — OFFICE VISIT (OUTPATIENT)
Dept: OPHTHALMOLOGY | Facility: CLINIC | Age: 73
End: 2023-05-17
Payer: OTHER GOVERNMENT

## 2023-05-17 DIAGNOSIS — H25.13 NUCLEAR SCLEROSIS, BILATERAL: Primary | ICD-10-CM

## 2023-05-17 DIAGNOSIS — Z98.83 GLAUCOMA FILTERING BLEB OF BOTH EYES: ICD-10-CM

## 2023-05-17 PROCEDURE — 92136 IOL MASTER - OU - BOTH EYES: ICD-10-PCS | Mod: 26,S$PBB,RT, | Performed by: OPHTHALMOLOGY

## 2023-05-17 PROCEDURE — 99999 PR PBB SHADOW E&M-EST. PATIENT-LVL III: CPT | Mod: PBBFAC,,, | Performed by: OPHTHALMOLOGY

## 2023-05-17 PROCEDURE — 99213 OFFICE O/P EST LOW 20 MIN: CPT | Mod: PBBFAC | Performed by: OPHTHALMOLOGY

## 2023-05-17 PROCEDURE — 99214 OFFICE O/P EST MOD 30 MIN: CPT | Mod: S$PBB,,, | Performed by: OPHTHALMOLOGY

## 2023-05-17 PROCEDURE — 99999 PR PBB SHADOW E&M-EST. PATIENT-LVL III: ICD-10-PCS | Mod: PBBFAC,,, | Performed by: OPHTHALMOLOGY

## 2023-05-17 PROCEDURE — 99214 PR OFFICE/OUTPT VISIT, EST, LEVL IV, 30-39 MIN: ICD-10-PCS | Mod: S$PBB,,, | Performed by: OPHTHALMOLOGY

## 2023-05-17 PROCEDURE — 92136 OPHTHALMIC BIOMETRY: CPT | Mod: PBBFAC | Performed by: OPHTHALMOLOGY

## 2023-05-17 RX ORDER — CYCLOP/TROP/PROPA/PHEN/KET/WAT 1-1-0.1%
1 DROPS (EA) OPHTHALMIC (EYE)
Status: CANCELLED | OUTPATIENT
Start: 2023-05-17

## 2023-05-17 RX ORDER — PREDNISOLONE ACETATE-GATIFLOXACIN-BROMFENAC .75; 5; 1 MG/ML; MG/ML; MG/ML
1 SUSPENSION/ DROPS OPHTHALMIC 3 TIMES DAILY
Qty: 5 ML | Refills: 3 | Status: SHIPPED | OUTPATIENT
Start: 2023-05-17 | End: 2023-09-07

## 2023-05-17 RX ORDER — MOXIFLOXACIN 5 MG/ML
1 SOLUTION/ DROPS OPHTHALMIC
Status: CANCELLED | OUTPATIENT
Start: 2023-05-17

## 2023-05-17 RX ORDER — PHENYLEPHRINE HYDROCHLORIDE 100 MG/ML
1 SOLUTION/ DROPS OPHTHALMIC
Status: CANCELLED | OUTPATIENT
Start: 2023-05-17

## 2023-05-17 NOTE — PROGRESS NOTES
HPI    Ref - Dr. Francisco    Primary open angle glaucoma (POAG) of both eyes, severe stage  Blindness and low vision  Dry eye syndrome of both eyes  Glaucoma filtering bleb of both eyes  Nuclear sclerotic cataract of both eyes    GTTS:  Latanoprost QHS OU     Pt here today for cat eval. Pt states that vision is cloudy. Pt has to   hold things really close to face to read. Pt denies eye pain.  Last edited by Taylor Phipps MA on 5/17/2023 11:06 AM.            Assessment /Plan     For exam results, see Encounter Report.    Nuclear sclerosis, bilateral    Glaucoma filtering bleb of both eyes      Visually Significant Cataract: Patient reports decreased vision consistent with the clinical amount of lenticular opacity, which reaches the level of visual significance and affects activities of daily living.     Specifically, this patient describes difficulty with:  - driving safely at night  - reading road signs  - reading small print  - deciphering medicine bottles  - reading the newspaper  - using the phone  - reading texts     Risks, benefits, and alternatives to cataract surgery were discussed and the consent reviewed. IOL options were discussed, including ATIOLs and the associated side effects and additional patient cost associated with them.   IOL Selections:   Right eye  IOL: CNA0T0 18.5     Left eye  IOL: CNA0T0 18.5    Pt wishes to have RIGHT eye done first.  PAS, HX glaucoma surgery OU

## 2023-05-30 ENCOUNTER — TELEPHONE (OUTPATIENT)
Dept: OPHTHALMOLOGY | Facility: CLINIC | Age: 73
End: 2023-05-30
Payer: OTHER GOVERNMENT

## 2023-05-30 DIAGNOSIS — H25.11 NUCLEAR SCLEROTIC CATARACT OF RIGHT EYE: Primary | ICD-10-CM

## 2023-06-01 ENCOUNTER — TELEPHONE (OUTPATIENT)
Dept: OPHTHALMOLOGY | Facility: CLINIC | Age: 73
End: 2023-06-01
Payer: OTHER GOVERNMENT

## 2023-06-01 DIAGNOSIS — H25.12 NUCLEAR SCLEROTIC CATARACT OF LEFT EYE: Primary | ICD-10-CM

## 2023-06-14 ENCOUNTER — TELEPHONE (OUTPATIENT)
Dept: OPHTHALMOLOGY | Facility: CLINIC | Age: 73
End: 2023-06-14
Payer: OTHER GOVERNMENT

## 2023-06-27 ENCOUNTER — TELEPHONE (OUTPATIENT)
Dept: OPHTHALMOLOGY | Facility: CLINIC | Age: 73
End: 2023-06-27
Payer: OTHER GOVERNMENT

## 2023-06-27 NOTE — TELEPHONE ENCOUNTER
----- Message from Darcy Leblanc sent at 6/27/2023  4:00 PM CDT -----  Regarding: Upcoming SX Information  Contact: Pt  Pt is requesting a callback regarding upcoming sx appts. Pt stated he need an advance notice for arrival time. Please adv pt        Confirmed contact below:   Contact Name:Levi Melendez  Phone Number: [phone]

## 2023-07-03 ENCOUNTER — TELEPHONE (OUTPATIENT)
Dept: OPHTHALMOLOGY | Facility: CLINIC | Age: 73
End: 2023-07-03
Payer: OTHER GOVERNMENT

## 2023-07-03 NOTE — TELEPHONE ENCOUNTER
----- Message from Horacio Delaney sent at 6/27/2023  4:09 PM CDT -----  Regarding: FW: Upcoming SX Information  Contact: Pt    ----- Message -----  From: Darcy Leblanc  Sent: 6/27/2023   4:02 PM CDT  To: [facility] Staff  Subject: Upcoming SX Information                          Pt is requesting a callback regarding upcoming sx appts. Pt stated he need an advance notice for arrival time. Please adv pt        Confirmed contact below:   Contact Name:Levi Melendez  Phone Number: [phone]

## 2023-07-06 ENCOUNTER — TELEPHONE (OUTPATIENT)
Dept: OPHTHALMOLOGY | Facility: CLINIC | Age: 73
End: 2023-07-06
Payer: OTHER GOVERNMENT

## 2023-07-06 NOTE — TELEPHONE ENCOUNTER
----- Message from Abran Hinton sent at 7/6/2023 10:35 AM CDT -----  Contact: [phone]  Pt is calling to find the location of his Sx. He has on his SX paper [id number] but states he spoke with someone that told him [employer]. Please call back to further assist. Pt need's to know as soon as possible to setup transportation today.

## 2023-07-10 ENCOUNTER — TELEPHONE (OUTPATIENT)
Dept: OPHTHALMOLOGY | Facility: CLINIC | Age: 73
End: 2023-07-10
Payer: OTHER GOVERNMENT

## 2023-07-10 NOTE — TELEPHONE ENCOUNTER
Patient given arrival time of 8:30 am on Thursday July 13 . Nothing to eat or drink after 11:59 pm.  Water, Gatorade after 11:59 pm until leaves home.  Start drops into the operative eye today. [address] [facility]

## 2023-07-11 ENCOUNTER — ANESTHESIA EVENT (OUTPATIENT)
Dept: SURGERY | Facility: HOSPITAL | Age: 73
End: 2023-07-11
Payer: OTHER GOVERNMENT

## 2023-07-11 NOTE — ANESTHESIA PREPROCEDURE EVALUATION
07/11/2023  Levi Melendez is a 73 y.o., male.      Pre-op Assessment    I have reviewed the Patient Summary Reports.    I have reviewed the NPO Status.   I have reviewed the Medications.     Review of Systems  Hematology/Oncology:         -- Cancer in past history: surgery    EENT/Dental:   Eyes:   Cardiovascular:   Pacemaker Hypertension CAD  Dysrhythmias (SSS)     Pulmonary:   COPD Mycobacteria  Sarcoidosis  Chronic cough   Renal/:   Chronic Renal Disease renal calculi BPH    Hepatic/GI:   Bowel prep: prostate cancer.      H/O prostate cancer Sick sinus syndrome   Pulmonary mycobacterial infection Sarcoidosis of lung   Glaucoma Renal calculi   HTN (hypertension) Cancer     Surgical History    CARDIAC CATHETERIZATION heart biopsy   EYE SURGERY CARDIAC PACEMAKER PLACEMENT   prostate seed implants          Physical Exam    Airway:  Mallampati: II   Mouth Opening: Normal  Neck ROM: Normal ROM        Anesthesia Plan  Type of Anesthesia, risks & benefits discussed:    Anesthesia Type: MAC  Intra-op Monitoring Plan: Standard ASA Monitors  Post Op Pain Control Plan: multimodal analgesia and IV/PO Opioids PRN  Induction:  IV  ASA Score: 3  Day of Surgery Review of History & Physical: H&P Update referred to the surgeon/provider.H&P completed by Anesthesiologist.    Ready For Surgery From Anesthesia Perspective.     .

## 2023-07-11 NOTE — PRE-PROCEDURE INSTRUCTIONS
- Nothing to eat or drink after midinight, except AM meds with small sips of water  - Hold all Diabetic meds AM of surgery  - Hold all Insulin AM of surgery  - Hold all Fluid pills AM of surgery  - Hold all non-insulin shots until after surgery (Ozempic, Mounjaro, Trulicity, Victoza, Byetta, Wegovy and Adlyxin) (up to 7 days prior)  - Take all B/P meds, except those that contain a fluid pill  - Hold all vits and herbal meds AM of surgery  - Use inhalers as needed and bring AM of surgery  - Take blood thinner meds AM of surgery  - Use eye drops as directed  - Shower and wash face with dial soap for 3 mins PM prior and AM of surgery  - No powder, lotions, creams, (makeup),  or jewelry    - Wear comfortable clothing (button up shirt)    (Patients ride may not leave while patient is in surgery)    -- 2nd floor surgery ctr at [facility] @ [address] [facility] [place], [place] [address]       Pt voiced understanding

## 2023-07-13 ENCOUNTER — ANESTHESIA (OUTPATIENT)
Dept: SURGERY | Facility: HOSPITAL | Age: 73
End: 2023-07-13
Payer: OTHER GOVERNMENT

## 2023-07-13 ENCOUNTER — HOSPITAL ENCOUNTER (OUTPATIENT)
Facility: HOSPITAL | Age: 73
Discharge: HOME | End: 2023-07-13
Attending: OPHTHALMOLOGY | Admitting: OPHTHALMOLOGY
Payer: OTHER GOVERNMENT

## 2023-07-13 VITALS
HEART RATE: 70 BPM | WEIGHT: 191 LBS | HEIGHT: 74 IN | OXYGEN SATURATION: 100 % | BODY MASS INDEX: 24.51 KG/M2 | RESPIRATION RATE: 18 BRPM | SYSTOLIC BLOOD PRESSURE: 129 MMHG | TEMPERATURE: 98 F | DIASTOLIC BLOOD PRESSURE: 66 MMHG

## 2023-07-13 DIAGNOSIS — H25.13 NUCLEAR SCLEROSIS, BILATERAL: ICD-10-CM

## 2023-07-13 DIAGNOSIS — H25.11 NUCLEAR SCLEROTIC CATARACT OF RIGHT EYE: Primary | ICD-10-CM

## 2023-07-13 PROCEDURE — D9220A PRA ANESTHESIA: Mod: ,,, | Performed by: NURSE ANESTHETIST, CERTIFIED REGISTERED

## 2023-07-13 PROCEDURE — 71000015 HC POSTOP RECOV 1ST HR: Performed by: OPHTHALMOLOGY

## 2023-07-13 PROCEDURE — 25000003 PHARM REV CODE 250: Performed by: OPHTHALMOLOGY

## 2023-07-13 PROCEDURE — 00142 ANES PX ON EYE LENS SURGERY: CPT | Performed by: OPHTHALMOLOGY

## 2023-07-13 PROCEDURE — 66982 PR REMOVAL, CATARACT, W/INSRT INTRAOC LENS, W/O ENDO CYCLO, CMPLX: ICD-10-PCS | Mod: RT,,, | Performed by: OPHTHALMOLOGY

## 2023-07-13 PROCEDURE — 36000707: Performed by: OPHTHALMOLOGY

## 2023-07-13 PROCEDURE — 36000706: Performed by: OPHTHALMOLOGY

## 2023-07-13 PROCEDURE — 66982 XCAPSL CTRC RMVL CPLX WO ECP: CPT | Mod: RT,,, | Performed by: OPHTHALMOLOGY

## 2023-07-13 PROCEDURE — D9220A PRA ANESTHESIA: ICD-10-PCS | Mod: ,,, | Performed by: NURSE ANESTHETIST, CERTIFIED REGISTERED

## 2023-07-13 PROCEDURE — 94761 N-INVAS EAR/PLS OXIMETRY MLT: CPT | Mod: 59

## 2023-07-13 PROCEDURE — 99900035 HC TECH TIME PER 15 MIN (STAT)

## 2023-07-13 PROCEDURE — 25000003 PHARM REV CODE 250: Performed by: NURSE ANESTHETIST, CERTIFIED REGISTERED

## 2023-07-13 PROCEDURE — 37000009 HC ANESTHESIA EA ADD 15 MINS: Performed by: OPHTHALMOLOGY

## 2023-07-13 PROCEDURE — V2632 POST CHMBR INTRAOCULAR LENS: HCPCS | Performed by: OPHTHALMOLOGY

## 2023-07-13 PROCEDURE — 37000008 HC ANESTHESIA 1ST 15 MINUTES: Performed by: OPHTHALMOLOGY

## 2023-07-13 DEVICE — LENS CLAREON AUTONOME 18.5D: Type: IMPLANTABLE DEVICE | Site: EYE | Status: FUNCTIONAL

## 2023-07-13 RX ORDER — MOXIFLOXACIN 5 MG/ML
1 SOLUTION/ DROPS OPHTHALMIC
Status: DISCONTINUED | OUTPATIENT
Start: 2023-07-13 | End: 2023-07-13 | Stop reason: HOSPADM

## 2023-07-13 RX ORDER — CYCLOP/TROP/PROPA/PHEN/KET/WAT 1-1-0.1%
1 DROPS (EA) OPHTHALMIC (EYE)
Status: COMPLETED | OUTPATIENT
Start: 2023-07-13 | End: 2023-07-13

## 2023-07-13 RX ORDER — LIDOCAINE HYDROCHLORIDE 10 MG/ML
INJECTION, SOLUTION EPIDURAL; INFILTRATION; INTRACAUDAL; PERINEURAL
Status: DISCONTINUED | OUTPATIENT
Start: 2023-07-13 | End: 2023-07-13 | Stop reason: HOSPADM

## 2023-07-13 RX ORDER — FOLIC ACID 0.4 MG
400 TABLET ORAL DAILY
COMMUNITY

## 2023-07-13 RX ORDER — PHENYLEPHRINE HYDROCHLORIDE 100 MG/ML
1 SOLUTION/ DROPS OPHTHALMIC
Status: DISCONTINUED | OUTPATIENT
Start: 2023-07-13 | End: 2023-07-13 | Stop reason: HOSPADM

## 2023-07-13 RX ORDER — TETRACAINE HYDROCHLORIDE 5 MG/ML
SOLUTION OPHTHALMIC
Status: DISCONTINUED | OUTPATIENT
Start: 2023-07-13 | End: 2023-07-13 | Stop reason: HOSPADM

## 2023-07-13 RX ORDER — PROPARACAINE HYDROCHLORIDE 5 MG/ML
1 SOLUTION/ DROPS OPHTHALMIC
Status: DISCONTINUED | OUTPATIENT
Start: 2023-07-13 | End: 2023-07-13 | Stop reason: HOSPADM

## 2023-07-13 RX ORDER — MOXIFLOXACIN 5 MG/ML
1 SOLUTION/ DROPS OPHTHALMIC
Status: COMPLETED | OUTPATIENT
Start: 2023-07-13 | End: 2023-07-13

## 2023-07-13 RX ORDER — LIDOCAINE HYDROCHLORIDE 40 MG/ML
INJECTION, SOLUTION RETROBULBAR
Status: DISCONTINUED | OUTPATIENT
Start: 2023-07-13 | End: 2023-07-13 | Stop reason: HOSPADM

## 2023-07-13 RX ORDER — ACETAMINOPHEN 325 MG/1
650 TABLET ORAL EVERY 4 HOURS PRN
Status: DISCONTINUED | OUTPATIENT
Start: 2023-07-13 | End: 2023-07-13 | Stop reason: HOSPADM

## 2023-07-13 RX ORDER — DEXMEDETOMIDINE HYDROCHLORIDE 100 UG/ML
INJECTION, SOLUTION INTRAVENOUS
Status: DISCONTINUED | OUTPATIENT
Start: 2023-07-13 | End: 2023-07-13

## 2023-07-13 RX ADMIN — MOXIFLOXACIN 1 DROP: 5 SOLUTION/ DROPS OPHTHALMIC at 10:07

## 2023-07-13 RX ADMIN — DEXMEDETOMIDINE 8 MCG: 100 INJECTION, SOLUTION, CONCENTRATE INTRAVENOUS at 09:07

## 2023-07-13 RX ADMIN — DEXMEDETOMIDINE 12 MCG: 100 INJECTION, SOLUTION, CONCENTRATE INTRAVENOUS at 09:07

## 2023-07-13 RX ADMIN — MOXIFLOXACIN OPHTHALMIC 1 DROP: 5 SOLUTION/ DROPS OPHTHALMIC at 08:07

## 2023-07-13 RX ADMIN — Medication 1 DROP: at 08:07

## 2023-07-13 NOTE — DISCHARGE INSTRUCTIONS
CATARACT SURGERY    POST-OPERATIVE INSTRUCTIONS    · Apply drops THREE times a day into operative eye for 30 days.    · DO NOT rub your eye    · Wear protective sunglasses during the day    · Resume moderate activity    · Bathe/shower/wash face normally    · DO NOT apply makeup around the operative eye for 1 week.         You should expect    - Blurry vision and halos for 24-48 hours    - Dilated pupil for 24-48 hours    - Scratchy feeling in the eye for 1-2 days    - Curved shadow in your peripheral vision for 2-3 weeks    - Occasional flickering of lights for up to 1 week    -If you experience severe pain or nausea, please call Dr Sanchez or the on-call doctor at [phone]    - Plan to see Dr Sanchez tomorrow .      [facility]    [address] [facility] [address]    ** Most patients can drive the next day, but if you do not feel comfortable driving, please arrange for transportation.

## 2023-07-13 NOTE — DISCHARGE SUMMARY
Outcome: Successful outpatient ophthalmic surgical procedure  Preprinted Instructions given to patient.  Regular diet.  Activity: No restrictions  Meds: see Med Rec  Condition: stable  Follow up: 1 day with Dr Sanchez  Disposition: Home  Diagnosis: s/p eye surgery  Date of discharge: 07/13/2023

## 2023-07-13 NOTE — PLAN OF CARE
Pt arrived to recovery dosc via stretcher per OR team. Bedside report received. Pt attached to bedside monitor. VSS.  Pt on 3L of oxygen via nasal cannula, which he states he uses at home for COPD; oxygen sats 100%.  Will continue to monitor.

## 2023-07-13 NOTE — PLAN OF CARE
Pt in preop bay 3, VSS, meds given and IV inserted. Pt denies any open wounds on body or the use of any weight loss injections. Pt needs and H&P, otherwise ready to roll.

## 2023-07-13 NOTE — PLAN OF CARE
Discharge instructions given to patient/  spouse and both verbalized understanding of all.  VSS, patient's home 02 tank at bedside and returned.  dDnies n/v and tolerating PO, rates pain level tolerable. IV removed, and family notified for patient discharge home.

## 2023-07-13 NOTE — OP NOTE
SURGEON:  Michael Sanchez M.D.    PREOPERATIVE DIAGNOSIS:    Nuclear Sclerotic Cataract    POSTOPERATIVE DIAGNOSIS:    Nuclear Sclerotic Cataract    PROCEDURES:    Complex Phacoemulsification with  intraocular lens, RIGHT eye (51082)    DATE OF SURGERY: 07/13/2023    IMPLANT: CNA0T0 18.5    ANESTHESIA:  MAC with topical Lidocaine    COMPLICATIONS:  None    ESTIMATED BLOOD LOSS: None    SPECIMENS: None    INDICATIONS:    The patient has a history of painless progressive visual loss and difficulty with activities of daily living, which specifically include difficult driving at night due to glare and difficulty reading small print, secondary to cataract formation. After a thorough discussion of the risks, benefits, and alternatives to cataract surgery, including, but not limited to, the rare risks of infection, retinal detachment, hemorrhage, need for additional surgery, loss of vision, and even loss of the eye, the patient voices understanding and desires to proceed.    DESCRIPTION OF PROCEDURE:    The patients IOL calculations were reviewed, and the lens selection confirmed.   After verification and marking of the proper eye in the preop holding area, the patient was brought to the operating room in supine position where the eye was prepped and draped in standard sterile fashion with 5% Betadine and a lid speculum placed in the eye.   Topical 4% Lidocaine was used in addition to the preoperative anesthesia and the procedure was begun by the creation of a paracentesis incision through which viscoelastic was used to fill the anterior chamber.  Next, a keratome blade was used to create a triplanar temporal clear corneal incision and a cystotome and Utrata forceps used to fashion a continuous curvilinear capsulorrhexis.  Hydrodissection was carried out using the Tam hydrodissection cannula and the nucleus was found to be mobile.  Phacoemulsification of the nucleus was carried out using a quick chop technique, and all  remaining epinuclear and cortical material was removed.  The eye was then reformed with Viscoelastic and the  intraocular lens was implanted into the capsular bag.  All remaining viscoelastics were removed from the eye and at the end of the case the pupil was round, the lens was well-centered within the capsular bag and all wounds were found to be water tight.  Drops of Vigamox and Pred Forte were instilled and a shield was placed over the eye. The patient will follow up with Dr. Sanchez in the morning.    ADDENDUM: Because the patient had a small pupil, a pupil dilating ring was used to enlarge the pupillary aperture and prevent complications.

## 2023-07-13 NOTE — ANESTHESIA POSTPROCEDURE EVALUATION
Anesthesia Post Evaluation    Patient: Levi Melendez    Procedure(s) Performed: Procedure(s) (LRB):  EXTRACTION, CATARACT, WITH IOL INSERTION (Right)    Final Anesthesia Type: MAC      Patient location during evaluation: PACU  Patient participation: Yes- Able to Participate  Level of consciousness: awake and alert  Post-procedure vital signs: reviewed and stable  Pain management: adequate  Airway patency: patent    PONV status at discharge: No PONV  Anesthetic complications: no      Cardiovascular status: stable  Respiratory status: spontaneous ventilation  Hydration status: euvolemic  Follow-up not needed.          Vitals Value Taken Time   /66 07/13/23 1037   Temp 36.4 °C (97.5 °F) 07/13/23 1008   Pulse 77 07/13/23 1037   Resp 18 07/13/23 1036   SpO2 100 % 07/13/23 1037   Vitals shown include unvalidated device data.      No case tracking events are documented in the log.      Pain/Shar Score: Shar Score: 9 (7/13/2023 10:36 AM)

## 2023-07-14 ENCOUNTER — OFFICE VISIT (OUTPATIENT)
Dept: OPHTHALMOLOGY | Facility: CLINIC | Age: 73
End: 2023-07-14
Payer: OTHER GOVERNMENT

## 2023-07-14 DIAGNOSIS — H25.11 NUCLEAR SCLEROSIS OF RIGHT EYE: ICD-10-CM

## 2023-07-14 DIAGNOSIS — Z98.890 POST-OPERATIVE STATE: Primary | ICD-10-CM

## 2023-07-14 PROCEDURE — 99999 PR PBB SHADOW E&M-EST. PATIENT-LVL III: CPT | Mod: PBBFAC,,, | Performed by: OPHTHALMOLOGY

## 2023-07-14 PROCEDURE — 99024 POSTOP FOLLOW-UP VISIT: CPT | Mod: ,,, | Performed by: OPHTHALMOLOGY

## 2023-07-14 PROCEDURE — 99024 PR POST-OP FOLLOW-UP VISIT: ICD-10-PCS | Mod: ,,, | Performed by: OPHTHALMOLOGY

## 2023-07-14 PROCEDURE — 99213 OFFICE O/P EST LOW 20 MIN: CPT | Mod: PBBFAC | Performed by: OPHTHALMOLOGY

## 2023-07-14 PROCEDURE — 99999 PR PBB SHADOW E&M-EST. PATIENT-LVL III: ICD-10-PCS | Mod: PBBFAC,,, | Performed by: OPHTHALMOLOGY

## 2023-07-14 NOTE — PROGRESS NOTES
HPI     Post-op Evaluation            Comments: 1 day phaco OD          Comments    Patient here for 1 day phaco OD    Patient states OD doing well since surgery. No pain/discomfort.    PGB TID OD    07/13/2023 IMPLANT: CNA0T0 18.5 OD          Last edited by Radha Concepcion MA on 7/14/2023  7:56 AM.            Assessment /Plan     For exam results, see Encounter Report.    Post-operative state    Nuclear sclerosis of right eye      Slit lamp exam:  L/L: nl  K: clear, wound sealed  AC: 1+ cell  Lens: IOL centered and stable    POD1 s/p Phaco/IOL  Appropriate precautions and post op medications reviewed.  Patient instructed to call or come in if symptoms of redness, decreased vision, or pain are experienced.

## 2023-07-24 ENCOUNTER — OFFICE VISIT (OUTPATIENT)
Dept: OPHTHALMOLOGY | Facility: CLINIC | Age: 73
End: 2023-07-24
Payer: OTHER GOVERNMENT

## 2023-07-24 DIAGNOSIS — Z98.890 POST-OPERATIVE STATE: Primary | ICD-10-CM

## 2023-07-24 DIAGNOSIS — H25.11 NUCLEAR SCLEROSIS OF RIGHT EYE: ICD-10-CM

## 2023-07-24 DIAGNOSIS — H25.12 NUCLEAR SCLEROSIS OF LEFT EYE: ICD-10-CM

## 2023-07-24 PROCEDURE — 99024 PR POST-OP FOLLOW-UP VISIT: ICD-10-PCS | Mod: ,,, | Performed by: OPHTHALMOLOGY

## 2023-07-24 PROCEDURE — 99214 OFFICE O/P EST MOD 30 MIN: CPT | Mod: PBBFAC | Performed by: OPHTHALMOLOGY

## 2023-07-24 PROCEDURE — 99999 PR PBB SHADOW E&M-EST. PATIENT-LVL IV: CPT | Mod: PBBFAC,,, | Performed by: OPHTHALMOLOGY

## 2023-07-24 PROCEDURE — 99024 POSTOP FOLLOW-UP VISIT: CPT | Mod: ,,, | Performed by: OPHTHALMOLOGY

## 2023-07-24 PROCEDURE — 99999 PR PBB SHADOW E&M-EST. PATIENT-LVL IV: ICD-10-PCS | Mod: PBBFAC,,, | Performed by: OPHTHALMOLOGY

## 2023-07-24 RX ORDER — PHENYLEPHRINE HYDROCHLORIDE 100 MG/ML
1 SOLUTION/ DROPS OPHTHALMIC
Status: CANCELLED | OUTPATIENT
Start: 2023-07-24

## 2023-07-24 RX ORDER — MOXIFLOXACIN 5 MG/ML
1 SOLUTION/ DROPS OPHTHALMIC
Status: CANCELLED | OUTPATIENT
Start: 2023-07-24

## 2023-07-24 RX ORDER — CYCLOP/TROP/PROPA/PHEN/KET/WAT 1-1-0.1%
1 DROPS (EA) OPHTHALMIC (EYE)
Status: CANCELLED | OUTPATIENT
Start: 2023-07-24

## 2023-07-24 NOTE — H&P (VIEW-ONLY)
HPI     Post-op Evaluation            Comments: 1 week phaco OD          Comments    Patient here for 1 week phaco OD    Patient states OD doing well since surgery. No pain but will have   occasionally itching.     PGB TID OD    07/13/2023 IMPLANT: CNA0T0 18.5 OD          Last edited by Radha Concepcion MA on 7/24/2023  1:13 PM.            Assessment /Plan     For exam results, see Encounter Report.    Post-operative state    Nuclear sclerosis of right eye      Slit lamp exam:  L/L: nl  K: clear, wound sealed  AC: trace cell  Iris/Lens: IOL centered and stable    POW1 s/p phaco: Surgery healing well with no signs of infection or abnormal inflammation.    Patient wishes to proceed with surgery in the second eye. Risks, benefits, alternatives reviewed. IOL selection reviewed.       Left eye  IOL: CNA0T0 18.5    PAS, HX glaucoma surgery OU

## 2023-07-25 ENCOUNTER — TELEPHONE (OUTPATIENT)
Dept: OPHTHALMOLOGY | Facility: CLINIC | Age: 73
End: 2023-07-25
Payer: OTHER GOVERNMENT

## 2023-07-25 NOTE — TELEPHONE ENCOUNTER
Patient given arrival time of 8:00 am on Thursday July 27. Nothing to eat or drink after 11:59 pm.  Water, Gatorade after 11:59 pm until leaves home.  Start drops into the operative eye today. [address] [facility]

## 2023-07-26 ENCOUNTER — ANESTHESIA EVENT (OUTPATIENT)
Dept: SURGERY | Facility: HOSPITAL | Age: 73
End: 2023-07-26
Payer: OTHER GOVERNMENT

## 2023-07-26 NOTE — PRE-PROCEDURE INSTRUCTIONS
- Nothing to eat or drink after midinight, except AM meds with small sips of water, Gatorade/Powerade  - Hold all Diabetic meds AM of surgery  - Hold all Insulin AM of surgery  - Hold all Fluid pills AM of surgery  - Hold all non-insulin shots until after surgery (Ozempic, Mounjaro, Trulicity, Victoza, Byetta, Wegovy and Adlyxin) (up to 7 days prior)  - Take all B/P meds, except those that contain a fluid pill  - Hold all vits and herbal meds AM of surgery  - Use inhalers as needed and bring AM of surgery  - Take blood thinner meds AM of surgery  - Use eye drops as directed  - Shower and wash face with soap for 3 mins PM prior and AM of surgery  - No powder, lotions, creams, (makeup),  or jewelry    - Wear comfortable clothing (button up shirt)    (Patients ride may not leave while patient is in surgery)    -- 2nd floor surgery ctr at [facility] @ [address] [facility] [place] [address]       Pt voiced understanding

## 2023-07-26 NOTE — ANESTHESIA PREPROCEDURE EVALUATION
07/26/2023  Levi Melendez is a 73 y.o., male.      Pre-op Assessment    I have reviewed the Patient Summary Reports.    I have reviewed the NPO Status.   I have reviewed the Medications.     Review of Systems  Anesthesia Hx:  No problems with previous Anesthesia   Denies Personal Hx of Anesthesia complications.   Hematology/Oncology:         -- Cancer in past history: surgery    EENT/Dental:   Eyes:   Cardiovascular:   Pacemaker Hypertension CAD  Dysrhythmias (SSS)  Hadr Iv Stick   Pulmonary:   COPD Mycobacteria  Sarcoidosis  Chronic cough   Renal/:   Chronic Renal Disease renal calculi BPH    Hepatic/GI:   Bowel prep: prostate cancer.                                                                                                                 07/27/2023  Levi Melendez is a 73 y.o., male.      Pre-op Assessment    I have reviewed the Patient Summary Reports.    I have reviewed the NPO Status.   I have reviewed the Medications.     Review of Systems  Hematology/Oncology:         -- Cancer in past history: surgery    EENT/Dental:   Eyes:   Cardiovascular:   Pacemaker Hypertension CAD  Dysrhythmias (SSS)     Pulmonary:   COPD Mycobacteria  Sarcoidosis  Chronic cough   Renal/:   Chronic Renal Disease renal calculi BPH    Hepatic/GI:   Bowel prep: prostate cancer.      H/O prostate cancer Sick sinus syndrome   Pulmonary mycobacterial infection Sarcoidosis of lung   Glaucoma Renal calculi   HTN (hypertension) Cancer     Surgical History    CARDIAC CATHETERIZATION heart biopsy   EYE SURGERY CARDIAC PACEMAKER PLACEMENT   prostate seed implants          Physical Exam    Airway:  Mallampati: II   Mouth Opening: Normal  Neck ROM: Normal ROM        Anesthesia Plan  Type of Anesthesia, risks & benefits discussed:    Anesthesia Type: MAC  Intra-op Monitoring Plan: Standard ASA Monitors  Post Op Pain Control Plan:  multimodal analgesia and IV/PO Opioids PRN  Induction:  IV  ASA Score: 3  Day of Surgery Review of History & Physical: H&P Update referred to the surgeon/provider.H&P completed by Anesthesiologist.    Ready For Surgery From Anesthesia Perspective.     .      H/O prostate cancer Sick sinus syndrome   Pulmonary mycobacterial infection Sarcoidosis of lung   Glaucoma Renal calculi   HTN (hypertension) Cancer     Surgical History    CARDIAC CATHETERIZATION heart biopsy   EYE SURGERY CARDIAC PACEMAKER PLACEMENT   prostate seed implants          Physical Exam  General: Well nourished, Cooperative, Alert and Oriented    Airway:  Mallampati: II   Mouth Opening: Normal  Neck ROM: Normal ROM        Anesthesia Plan  Type of Anesthesia, risks & benefits discussed:    Anesthesia Type: MAC  Intra-op Monitoring Plan: Standard ASA Monitors  Post Op Pain Control Plan: multimodal analgesia and IV/PO Opioids PRN  Induction:  IV  Informed Consent: Informed consent signed with the Patient and all parties understand the risks and agree with anesthesia plan.  All questions answered.   ASA Score: 3  Day of Surgery Review of History & Physical: H&P Update referred to the surgeon/provider.I have interviewed and examined the patient. I have reviewed the patient's H&P dated: There are no significant changes. H&P completed by Anesthesiologist.    Ready For Surgery From Anesthesia Perspective.     .

## 2023-07-27 ENCOUNTER — HOSPITAL ENCOUNTER (OUTPATIENT)
Facility: HOSPITAL | Age: 73
Discharge: HOME | End: 2023-07-27
Attending: OPHTHALMOLOGY | Admitting: OPHTHALMOLOGY
Payer: OTHER GOVERNMENT

## 2023-07-27 ENCOUNTER — ANESTHESIA (OUTPATIENT)
Dept: SURGERY | Facility: HOSPITAL | Age: 73
End: 2023-07-27
Payer: OTHER GOVERNMENT

## 2023-07-27 VITALS
OXYGEN SATURATION: 100 % | TEMPERATURE: 98 F | DIASTOLIC BLOOD PRESSURE: 68 MMHG | HEART RATE: 72 BPM | SYSTOLIC BLOOD PRESSURE: 122 MMHG | RESPIRATION RATE: 20 BRPM

## 2023-07-27 DIAGNOSIS — H25.12 NUCLEAR SCLEROTIC CATARACT OF LEFT EYE: Primary | ICD-10-CM

## 2023-07-27 DIAGNOSIS — H25.12 NUCLEAR SCLEROSIS OF LEFT EYE: ICD-10-CM

## 2023-07-27 DIAGNOSIS — Z98.890 POST-OPERATIVE STATE: ICD-10-CM

## 2023-07-27 PROCEDURE — 37000008 HC ANESTHESIA 1ST 15 MINUTES: Performed by: OPHTHALMOLOGY

## 2023-07-27 PROCEDURE — D9220A PRA ANESTHESIA: ICD-10-PCS | Mod: ,,, | Performed by: NURSE ANESTHETIST, CERTIFIED REGISTERED

## 2023-07-27 PROCEDURE — V2632 POST CHMBR INTRAOCULAR LENS: HCPCS | Performed by: OPHTHALMOLOGY

## 2023-07-27 PROCEDURE — D9220A PRA ANESTHESIA: Mod: ,,, | Performed by: NURSE ANESTHETIST, CERTIFIED REGISTERED

## 2023-07-27 PROCEDURE — 00142 ANES PX ON EYE LENS SURGERY: CPT | Performed by: OPHTHALMOLOGY

## 2023-07-27 PROCEDURE — 36000706: Performed by: OPHTHALMOLOGY

## 2023-07-27 PROCEDURE — 66982 PR REMOVAL, CATARACT, W/INSRT INTRAOC LENS, W/O ENDO CYCLO, CMPLX: ICD-10-PCS | Mod: 79,LT,, | Performed by: OPHTHALMOLOGY

## 2023-07-27 PROCEDURE — 25000003 PHARM REV CODE 250: Performed by: OPHTHALMOLOGY

## 2023-07-27 PROCEDURE — 71000015 HC POSTOP RECOV 1ST HR: Performed by: OPHTHALMOLOGY

## 2023-07-27 PROCEDURE — 66982 XCAPSL CTRC RMVL CPLX WO ECP: CPT | Mod: 79,LT,, | Performed by: OPHTHALMOLOGY

## 2023-07-27 PROCEDURE — 36000707: Performed by: OPHTHALMOLOGY

## 2023-07-27 PROCEDURE — 37000009 HC ANESTHESIA EA ADD 15 MINS: Performed by: OPHTHALMOLOGY

## 2023-07-27 PROCEDURE — 25000003 PHARM REV CODE 250: Performed by: ANESTHESIOLOGY

## 2023-07-27 DEVICE — LENS CLAREON AUTONOME 18.5D: Type: IMPLANTABLE DEVICE | Site: EYE | Status: FUNCTIONAL

## 2023-07-27 RX ORDER — MOXIFLOXACIN 5 MG/ML
1 SOLUTION/ DROPS OPHTHALMIC
Status: COMPLETED | OUTPATIENT
Start: 2023-07-27 | End: 2023-07-27

## 2023-07-27 RX ORDER — MOXIFLOXACIN 5 MG/ML
SOLUTION/ DROPS OPHTHALMIC
Status: DISCONTINUED | OUTPATIENT
Start: 2023-07-27 | End: 2023-07-27 | Stop reason: HOSPADM

## 2023-07-27 RX ORDER — TETRACAINE HYDROCHLORIDE 5 MG/ML
SOLUTION OPHTHALMIC
Status: DISCONTINUED | OUTPATIENT
Start: 2023-07-27 | End: 2023-07-27 | Stop reason: HOSPADM

## 2023-07-27 RX ORDER — PROPARACAINE HYDROCHLORIDE 5 MG/ML
1 SOLUTION/ DROPS OPHTHALMIC
Status: DISCONTINUED | OUTPATIENT
Start: 2023-07-27 | End: 2023-07-27 | Stop reason: HOSPADM

## 2023-07-27 RX ORDER — DEXMEDETOMIDINE HYDROCHLORIDE 100 UG/ML
INJECTION, SOLUTION INTRAVENOUS
Status: DISCONTINUED | OUTPATIENT
Start: 2023-07-27 | End: 2023-07-27

## 2023-07-27 RX ORDER — PHENYLEPHRINE HYDROCHLORIDE 100 MG/ML
1 SOLUTION/ DROPS OPHTHALMIC
Status: DISCONTINUED | OUTPATIENT
Start: 2023-07-27 | End: 2023-07-27 | Stop reason: HOSPADM

## 2023-07-27 RX ORDER — LIDOCAINE HYDROCHLORIDE 40 MG/ML
INJECTION, SOLUTION RETROBULBAR
Status: DISCONTINUED | OUTPATIENT
Start: 2023-07-27 | End: 2023-07-27 | Stop reason: HOSPADM

## 2023-07-27 RX ORDER — CYCLOP/TROP/PROPA/PHEN/KET/WAT 1-1-0.1%
1 DROPS (EA) OPHTHALMIC (EYE)
Status: COMPLETED | OUTPATIENT
Start: 2023-07-27 | End: 2023-07-27

## 2023-07-27 RX ORDER — ACETAMINOPHEN 325 MG/1
650 TABLET ORAL EVERY 4 HOURS PRN
Status: DISCONTINUED | OUTPATIENT
Start: 2023-07-27 | End: 2023-07-27 | Stop reason: HOSPADM

## 2023-07-27 RX ADMIN — MOXIFLOXACIN 1 DROP: 5 SOLUTION/ DROPS OPHTHALMIC at 09:07

## 2023-07-27 RX ADMIN — DEXMEDETOMIDINE HYDROCHLORIDE 10 MCG: 100 INJECTION, SOLUTION INTRAVENOUS at 09:07

## 2023-07-27 RX ADMIN — MOXIFLOXACIN OPHTHALMIC 1 DROP: 5 SOLUTION/ DROPS OPHTHALMIC at 08:07

## 2023-07-27 RX ADMIN — Medication 1 DROP: at 08:07

## 2023-07-27 NOTE — DISCHARGE SUMMARY
Outcome: Successful outpatient ophthalmic surgical procedure  Preprinted Instructions given to patient.  Regular diet.  Activity: No restrictions  Meds: see Med Rec  Condition: stable  Follow up: 1 day with Dr Sanchez  Disposition: Home  Diagnosis: s/p eye surgery  Date of discharge: 07/27/2023

## 2023-07-27 NOTE — ANESTHESIA POSTPROCEDURE EVALUATION
Anesthesia Post Evaluation    Patient: Levi Melendez    Procedure(s) Performed: Procedure(s) (LRB):  EXTRACTION, CATARACT, WITH IOL INSERTION (Left)    Final Anesthesia Type: MAC      Patient location during evaluation: PACU  Patient participation: Yes- Able to Participate  Level of consciousness: awake and alert  Post-procedure vital signs: reviewed and stable  Pain management: adequate  Airway patency: patent    PONV status at discharge: No PONV  Anesthetic complications: no      Cardiovascular status: blood pressure returned to baseline  Respiratory status: unassisted, spontaneous ventilation and room air  Hydration status: euvolemic  Follow-up not needed.          Vitals Value Taken Time   /60 07/27/23 1007   Temp 36.6 °C (97.8 °F) 07/27/23 0949   Pulse 81 07/27/23 1009   Resp 20 07/27/23 1000   SpO2 100 % 07/27/23 1009   Vitals shown include unvalidated device data.      No case tracking events are documented in the log.      Pain/Shar Score: Shar Score: 10 (7/27/2023 10:00 AM)

## 2023-07-27 NOTE — OP NOTE
SURGEON:  Michael Sanchez M.D.    PREOPERATIVE DIAGNOSIS:    Nuclear Sclerotic Cataract Left Eye    POSTOPERATIVE DIAGNOSIS:    Nuclear Sclerotic Cataract Left Eye    PROCEDURES:    Complex Phacoemulsification with  intraocular lens, Left eye (10167)    DATE OF SURGERY: 07/27/2023    IMPLANT: CNA0T0 18.5    ANESTHESIA:  MAC with topical Lidocaine    COMPLICATIONS:  None    ESTIMATED BLOOD LOSS: None    SPECIMENS: None    INDICATIONS:    The patient has a history of painless progressive visual loss and difficulty with activities of daily living, which specifically include difficult driving at night due to glare and difficulty reading small print, secondary to cataract formation.  After a thorough discussion of the risks, benefits, and alternatives to cataract surgery, including, but not limited to, the rare risks of infection, retinal detachment, hemorrhage, need for additional surgery, loss of vision, and even loss of the eye, the patient voices understanding and desires to proceed.    DESCRIPTION OF PROCEDURE:    The patients IOL calculations were reviewed, and the lens selection confirmed.   After verification and marking of the proper eye in the preop holding area, the patient was brought to the operating room in supine position where the eye was prepped and draped in standard sterile fashion with 5% Betadine and a lid speculum placed in the eye.   Topical 4% Lidocaine was used in addition to the preoperative anesthesia and the procedure was begun by the creation of a paracentesis incision through which viscoelastic was used to fill the anterior chamber.  Next, a keratome blade was used to create a triplanar temporal clear corneal incision and a cystotome and Utrata forceps used to fashion a continuous curvilinear capsulorrhexis.  Hydrodissection was carried out using the Tam hydrodissection cannula and the nucleus was found to be mobile.  Phacoemulsification of the nucleus was carried out using a quick chop  technique, and all remaining epinuclear and cortical material was removed.  The eye was then reformed with Viscoelastic and the  intraocular lens was implanted into the capsular bag.  All remaining viscoelastics were removed from the eye and at the end of the case the pupil was round, the lens was well-centered within the capsular bag and all wounds were found to be water tight.  Drops of Vigamox and Pred Forte were instilled and a shield was placed over the eye. The patient will follow up with Dr. Sanchez in the morning.    ADDENDUM: Because the patient had a dense cataract and loss of red reflex, trypan blue was used to stain the anterior capsule.

## 2023-07-27 NOTE — TRANSFER OF CARE
Anesthesia Transfer of Care Note    Patient: Levi Melendez    Procedure(s) Performed: Procedure(s) (LRB):  EXTRACTION, CATARACT, WITH IOL INSERTION (Left)    Patient location: OPS    Anesthesia Type: MAC    Transport from OR: Transported from OR on room air with adequate spontaneous ventilation    Post pain: adequate analgesia    Post assessment: no apparent anesthetic complications    Post vital signs: stable    Level of consciousness: awake, alert and oriented    Nausea/Vomiting: no nausea/vomiting    Complications: none    Transfer of care protocol was followed      Last vitals:   Visit Vitals  BP (!) 162/88 (BP Location: Left arm, Patient Position: Sitting)   Pulse 63   Temp 36.2 °C (97.2 °F) (Skin)   Resp 16   SpO2 100%

## 2023-07-27 NOTE — DISCHARGE INSTRUCTIONS
CATARACT SURGERY    POST-OPERATIVE INSTRUCTIONS    · Apply drops THREE times a day into operative eye for 30 days.    · DO NOT rub your eye    · Wear protective sunglasses during the day    · Resume moderate activity    · Bathe/shower/wash face normally    · DO NOT apply makeup around the operative eye for 1 week.         You should expect    - Blurry vision and halos for 24-48 hours    - Dilated pupil for 24-48 hours    - Scratchy feeling in the eye for 1-2 days    - Curved shadow in your peripheral vision for 2-3 weeks    - Occasional flickering of lights for up to 1 week    -If you experience severe pain or nausea, please call Dr Sanchez or the on-call doctor at [phone]    - Plan to see Dr Sanchez tomorrow .      [facility]    [address] [address]    ** Most patients can drive the next day, but if you do not feel comfortable driving, please arrange for transportation.

## 2023-07-27 NOTE — PLAN OF CARE
Discharge instructions given and explained to patient and family with verbalization of understanding all instructions. Patient is AAOx3,v/s stable, denies n/v and tolerating po, rates pain level tolerable, IV removed, and family at bedside. Patient discharged to home. Patient transported off unit via wheelchair to private vehicle with family.

## 2023-07-28 ENCOUNTER — OFFICE VISIT (OUTPATIENT)
Dept: OPHTHALMOLOGY | Facility: CLINIC | Age: 73
End: 2023-07-28
Payer: OTHER GOVERNMENT

## 2023-07-28 DIAGNOSIS — H25.12 NUCLEAR SCLEROSIS OF LEFT EYE: ICD-10-CM

## 2023-07-28 DIAGNOSIS — Z98.890 POST-OPERATIVE STATE: Primary | ICD-10-CM

## 2023-07-28 PROCEDURE — 99024 POSTOP FOLLOW-UP VISIT: CPT | Mod: ,,, | Performed by: OPHTHALMOLOGY

## 2023-07-28 PROCEDURE — 99999 PR PBB SHADOW E&M-EST. PATIENT-LVL III: ICD-10-PCS | Mod: PBBFAC,,, | Performed by: OPHTHALMOLOGY

## 2023-07-28 PROCEDURE — 99024 PR POST-OP FOLLOW-UP VISIT: ICD-10-PCS | Mod: ,,, | Performed by: OPHTHALMOLOGY

## 2023-07-28 PROCEDURE — 99999 PR PBB SHADOW E&M-EST. PATIENT-LVL III: CPT | Mod: PBBFAC,,, | Performed by: OPHTHALMOLOGY

## 2023-07-28 PROCEDURE — 99213 OFFICE O/P EST LOW 20 MIN: CPT | Mod: PBBFAC | Performed by: OPHTHALMOLOGY

## 2023-08-25 ENCOUNTER — TELEPHONE (OUTPATIENT)
Dept: OPHTHALMOLOGY | Facility: CLINIC | Age: 73
End: 2023-08-25
Payer: OTHER GOVERNMENT

## 2023-08-25 NOTE — TELEPHONE ENCOUNTER
----- Message from Robinson Estrada sent at 8/25/2023  2:41 PM CDT -----  Consult/Advisory    Name Of Caller:Levi       Contact Preference:[phone]    Nature of call: Ptn returning missed call

## 2023-08-25 NOTE — TELEPHONE ENCOUNTER
----- Message from Darcy Palacio OD sent at 8/25/2023  1:59 PM CDT -----  Contact: [phone]  Can possibly offer Fri Sept 1 at 9? Slot still open :)  ----- Message -----  From: Abran Hinton  Sent: 8/25/2023   1:57 PM CDT  To: Darcy Palacio Staff    Pt is calling to see if he can reschedule his post-op for later in the week. Please call back to further assist.

## 2023-08-28 ENCOUNTER — TELEPHONE (OUTPATIENT)
Dept: OPHTHALMOLOGY | Facility: CLINIC | Age: 73
End: 2023-08-28
Payer: OTHER GOVERNMENT

## 2023-08-28 NOTE — TELEPHONE ENCOUNTER
----- Message from Darcy Palacio OD sent at 8/28/2023  4:04 PM CDT -----    ----- Message -----  From: Ayse Walsh  Sent: 8/28/2023   4:01 PM CDT  To: Darcy Palacio Staff    Type:  Needs Medical Advice    Who Called: pt     Would the patient rather a call back or a response via [facility]? call  Best Call Back Number: [phone]  Additional Information: pt is requesting to get rescheduled for the appointment that was cancelled for 08/29/23 for a 1 month follow up

## 2023-08-28 NOTE — TELEPHONE ENCOUNTER
----- Message from Darcy Palacio OD sent at 8/28/2023  9:44 AM CDT -----  Contact: [phone]  This is the one you left a vm for he is trying to get back in touch  From prev message: Can possibly offer Fri Sept 1 at 9? Slot still open :)   ----- Message -----  From: Abran Hinton  Sent: 8/28/2023   9:42 AM CDT  To: Darcy Palacio Staff    Pt is calling to reschedule his post-op appt to see if he can be seen later in the week. Please call back to further assist.

## 2023-08-30 ENCOUNTER — TELEPHONE (OUTPATIENT)
Dept: OPTOMETRY | Facility: CLINIC | Age: 73
End: 2023-08-30
Payer: OTHER GOVERNMENT

## 2023-08-30 ENCOUNTER — TELEPHONE (OUTPATIENT)
Dept: OPHTHALMOLOGY | Facility: CLINIC | Age: 73
End: 2023-08-30
Payer: OTHER GOVERNMENT

## 2023-08-30 NOTE — TELEPHONE ENCOUNTER
----- Message from Johana Gregory sent at 8/30/2023 11:34 AM CDT -----  Hi! This patient was originally scheduled to see Dr. Palacio for a post op, but he stated that he needs to be seen on the [facility] due to transportation issues. Would he be able to be seen by either Dr. Villalpando or Dr. Watson?   ----- Message -----  From: Darcy Palacio OD  Sent: 8/30/2023  11:29 AM CDT  To: Johana Villalpando or Dr. Watson  ----- Message -----  From: Johana Gregory  Sent: 8/30/2023  10:59 AM CDT  To: Darcy Palacio OD    He is asking if he can be seen by someone on the [facility] due to transportation. Who can I forward the message to that is over on that side?  ----- Message -----  From: Darcy Palacio OD  Sent: 8/30/2023  10:53 AM CDT  To: Johana Gregory    OK to doublebook this pt somewhere if you are ever able to reach him  ----- Message -----  From: Darcy Palacio OD  Sent: 8/28/2023   4:04 PM CDT  To: Johana Gregory      ----- Message -----  From: Ayse Walsh  Sent: 8/28/2023   4:01 PM CDT  To: Darcy Palacio Staff    Type:  Needs Medical Advice    Who Called: pt     Would the patient rather a call back or a response via [facility]? call  Best Call Back Number: [phone]  Additional Information: pt is requesting to get rescheduled for the appointment that was cancelled for 08/29/23 for a 1 month follow up

## 2023-08-30 NOTE — TELEPHONE ENCOUNTER
----- Message from Darcy Palacio OD sent at 8/30/2023 10:53 AM CDT -----  OK to doublebook this pt somewhere if you are ever able to reach him  ----- Message -----  From: Darcy Palacio OD  Sent: 8/28/2023   4:04 PM CDT  To: Johana Gregory      ----- Message -----  From: Ayse Walsh  Sent: 8/28/2023   4:01 PM CDT  To: Darcy Palacio Staff    Type:  Needs Medical Advice    Who Called: pt     Would the patient rather a call back or a response via [facility]? call  Best Call Back Number: [phone]  Additional Information: pt is requesting to get rescheduled for the appointment that was cancelled for 08/29/23 for a 1 month follow up

## 2023-08-30 NOTE — TELEPHONE ENCOUNTER
I called pt to set up an appt with Dr. Watson for 1 month PO with Dr. Sanchez no answer left message for pt to call us back.

## 2023-09-07 ENCOUNTER — OFFICE VISIT (OUTPATIENT)
Dept: OPTOMETRY | Facility: CLINIC | Age: 73
End: 2023-09-07
Payer: OTHER GOVERNMENT

## 2023-09-07 DIAGNOSIS — H40.1133 PRIMARY OPEN ANGLE GLAUCOMA (POAG) OF BOTH EYES, SEVERE STAGE: ICD-10-CM

## 2023-09-07 DIAGNOSIS — Z98.83 GLAUCOMA FILTERING BLEB OF BOTH EYES: ICD-10-CM

## 2023-09-07 DIAGNOSIS — Z98.890 POST-OPERATIVE STATE: Primary | ICD-10-CM

## 2023-09-07 PROBLEM — H25.12 NUCLEAR SCLEROTIC CATARACT OF LEFT EYE: Status: RESOLVED | Noted: 2021-08-16 | Resolved: 2023-09-07

## 2023-09-07 PROBLEM — Z96.1 PSEUDOPHAKIA: Status: ACTIVE | Noted: 2023-09-07

## 2023-09-07 PROCEDURE — 99024 PR POST-OP FOLLOW-UP VISIT: ICD-10-PCS | Mod: ,,, | Performed by: OPTOMETRIST

## 2023-09-07 PROCEDURE — 99999 PR PBB SHADOW E&M-EST. PATIENT-LVL III: CPT | Mod: PBBFAC,,, | Performed by: OPTOMETRIST

## 2023-09-07 PROCEDURE — 99999 PR PBB SHADOW E&M-EST. PATIENT-LVL III: ICD-10-PCS | Mod: PBBFAC,,, | Performed by: OPTOMETRIST

## 2023-09-07 PROCEDURE — 99213 OFFICE O/P EST LOW 20 MIN: CPT | Mod: PBBFAC,PO | Performed by: OPTOMETRIST

## 2023-09-07 PROCEDURE — 99024 POSTOP FOLLOW-UP VISIT: CPT | Mod: ,,, | Performed by: OPTOMETRIST

## 2023-09-07 NOTE — PROGRESS NOTES
Subjective:       Patient ID: Levi Melendez is a 73 y.o. male      Chief Complaint   Patient presents with    Post-op Evaluation     History of Present Illness    Dls: 7/28/23 Dr. Sanchez     72 y/o male presents today for PO 1 month Phaco OU   Pt states ou doing well.     No pain  No floaters  No flashes    Eye meds  Latanoprost OU Q HS last dose last night   Tears OU PRN          Assessment/Plan:     1. Post-operative state  S/p CE OU - Doing well.  RE-Pt does no want MRx.  Pt state gets headache after using readers, currently using +3.25, advised likely to strong and to reduce to +2.50 range.      2. Primary open angle glaucoma (POAG) of both eyes, severe stage  3. Glaucoma filtering bleb of both eyes  IOP doing well. Continue latanoprost QHS OU. Re-establish care with Dr. Francisco, pt overdue for f/u.    Follow up in about 3 months (around 12/18/2023) for re-establish care with Dr. Francisco for glaucoma follow up .

## 2023-09-11 NOTE — TRANSFER OF CARE
"Anesthesia Transfer of Care Note    Patient: Levi Melendez    Procedure(s) Performed: Procedure(s) (LRB):  EXTRACTION, CATARACT, WITH IOL INSERTION (Right)    Patient location: PACU    Anesthesia Type: MAC    Transport from OR: Transported from OR on 2-3 L/min O2 by NC with adequate spontaneous ventilation    Post pain: adequate analgesia    Post assessment: no apparent anesthetic complications and tolerated procedure well    Post vital signs: stable    Level of consciousness: awake, alert and oriented    Nausea/Vomiting: no nausea/vomiting    Complications: none    Transfer of care protocol was followed      Last vitals:   Visit Vitals  BP (!) 148/79 (BP Location: Left arm, Patient Position: Lying)   Pulse 75   Temp 36.6 °C (97.9 °F) (Temporal)   Resp 16   Ht 6' 1.5" (1.867 m)   Wt 86.6 kg (191 lb)   SpO2 100%   BMI 24.86 kg/m²     " SEE BELOW FOR OUR NEW PHONE NUMBER!!!      Thanks for visiting us today!    Remember these important phone numbers:    [phone] for phone nurses during the day and our nurse answering service at night    [phone]   for scheduling or changing future appointments    [phone] for the Poison Control Center    When leaving a message for our staff, please include:   the spelling of your child’s full name and date of birth  your full name and relationship to child  best phone number and time to reach you   reason for the call      We strongly recommend that all children age 6 months and older receive the COVID-19 vaccine. Call our office at [phone]  to schedule.      Where's the best place on the internet to look up health information about kids?  HealthyChildren.org  From the American Academy [employer] Pediatrics        Is your child signed up for Search[employer]? If you do this, you can message us rather than playing phone tag! You can also look at labs, pay your bills, and do some scheduling. Go to your own account first. (If you don't have one yet, you can set one up at the website below or at your doctor's office).  Using a web browser (not the phone luke), on the right hand side of your page, click the button marked \"Request Access to my Child's Records.\" Fill out the information. In a few days your child's information will be linked to your account. It's that simple!! Here is the website for more information:    [email or web address]        --------------------------------------------------------------------------------------------------------------------

## 2023-12-18 ENCOUNTER — OFFICE VISIT (OUTPATIENT)
Dept: OPHTHALMOLOGY | Facility: CLINIC | Age: 73
End: 2023-12-18
Payer: OTHER GOVERNMENT

## 2023-12-18 DIAGNOSIS — H40.1133 PRIMARY OPEN ANGLE GLAUCOMA (POAG) OF BOTH EYES, SEVERE STAGE: Primary | ICD-10-CM

## 2023-12-18 DIAGNOSIS — Z98.83 GLAUCOMA FILTERING BLEB OF BOTH EYES: ICD-10-CM

## 2023-12-18 DIAGNOSIS — Z96.1 PSEUDOPHAKIA: ICD-10-CM

## 2023-12-18 DIAGNOSIS — H54.10 BLINDNESS AND LOW VISION: ICD-10-CM

## 2023-12-18 DIAGNOSIS — H04.123 DRY EYE SYNDROME OF BOTH EYES: ICD-10-CM

## 2023-12-18 PROCEDURE — 92133 POSTERIOR SEGMENT OCT OPTIC NERVE(OCULAR COHERENCE TOMOGRAPHY) - OU - BOTH EYES: ICD-10-PCS | Mod: 26,S$PBB,, | Performed by: OPHTHALMOLOGY

## 2023-12-18 PROCEDURE — 99213 OFFICE O/P EST LOW 20 MIN: CPT | Mod: PBBFAC,PO | Performed by: OPHTHALMOLOGY

## 2023-12-18 PROCEDURE — 99999 PR PBB SHADOW E&M-EST. PATIENT-LVL III: ICD-10-PCS | Mod: PBBFAC,,, | Performed by: OPHTHALMOLOGY

## 2023-12-18 PROCEDURE — 99999 PR PBB SHADOW E&M-EST. PATIENT-LVL III: CPT | Mod: PBBFAC,,, | Performed by: OPHTHALMOLOGY

## 2023-12-18 PROCEDURE — 99214 PR OFFICE/OUTPT VISIT, EST, LEVL IV, 30-39 MIN: ICD-10-PCS | Mod: S$PBB,,, | Performed by: OPHTHALMOLOGY

## 2023-12-18 PROCEDURE — 92133 CPTRZD OPH DX IMG PST SGM ON: CPT | Mod: PBBFAC,PO | Performed by: OPHTHALMOLOGY

## 2023-12-18 PROCEDURE — 99214 OFFICE O/P EST MOD 30 MIN: CPT | Mod: S$PBB,,, | Performed by: OPHTHALMOLOGY

## 2023-12-18 RX ORDER — IPRATROPIUM BROMIDE AND ALBUTEROL SULFATE 2.5; .5 MG/3ML; MG/3ML
SOLUTION RESPIRATORY (INHALATION)
COMMUNITY
Start: 2023-11-22

## 2023-12-18 NOTE — PROGRESS NOTES
HPI     Glaucoma     Additional comments: Iop chk/dfe           Comments    Pt states his va is doing well for the most part. His eyes do become   somewhat blurry at times in OU though.    PCIOL OU  POAG   Multiple Trabs- OS x's 2, OD x's 1     Latanoprost QHS OU             Last edited by Moise Zaragoza on 12/18/2023 10:45 AM.            Assessment /Plan     For exam results, see Encounter Report.    Primary open angle glaucoma (POAG) of both eyes, severe stage  -     Posterior Segment OCT Optic Nerve- Both eyes    Dry eye syndrome of both eyes    Blindness and low vision    Pseudophakia    Glaucoma filtering bleb of both eyes      [facility] Sarcoid O2 dependent    Advanced POAG  Ayalla    CCT  444 // 443    Low teens --> achieved    Both eyes --> tolerating well & good adherence --> CSM --> [facility]  Xal q hs    Simbrinza BID --> Not using // GI Upset  Alphagan BID --> Not using // GI Upset     No BB --> Pulm Sarcoid 02 dependent & Oral steroids    SP Trabs  OD x 1  OS x 2    SP CE IOL OU JANES Sanchez  Patient happy with improved VA  Quiet  Observe    Blebitis precautions were discussed with patient/family and to call and return to clinic immediately for redness, pain, decreasing vision, lid swelling and discharge in eye with previous glaucoma surgery.      Plan  RTC 4 months IOP & Adherence --> try HVF Faster or 10-2  RTC sooner prn with good understanding

## 2024-04-12 ENCOUNTER — TELEPHONE (OUTPATIENT)
Dept: OPHTHALMOLOGY | Facility: CLINIC | Age: 74
End: 2024-04-12
Payer: OTHER GOVERNMENT

## 2024-04-12 NOTE — TELEPHONE ENCOUNTER
----- Message from Lucero Alvarez sent at 4/12/2024  2:42 PM CDT -----  Regarding: Patient Returning Missed Call    Patient is returning missed call . Pts call back- [phone]

## 2024-04-12 NOTE — TELEPHONE ENCOUNTER
----- Message from Maryjane Avendaño sent at 4/12/2024 10:36 AM CDT -----  Contact: pt @ [phone]  Levi Melendez calling regarding Appointment Access  (message) for #pt is calling to see if possible can be seen before 7/15 due to having eye problems, asking for call back

## 2024-04-23 ENCOUNTER — OFFICE VISIT (OUTPATIENT)
Dept: OPHTHALMOLOGY | Facility: CLINIC | Age: 74
End: 2024-04-23
Payer: OTHER GOVERNMENT

## 2024-04-23 DIAGNOSIS — H02.205 LAGOPHTHALMOS OF BOTH LOWER EYELIDS: ICD-10-CM

## 2024-04-23 DIAGNOSIS — H54.10 BLINDNESS AND LOW VISION: ICD-10-CM

## 2024-04-23 DIAGNOSIS — H02.202 LAGOPHTHALMOS OF BOTH LOWER EYELIDS: ICD-10-CM

## 2024-04-23 DIAGNOSIS — H04.123 DRY EYE SYNDROME OF BOTH EYES: ICD-10-CM

## 2024-04-23 DIAGNOSIS — Z96.1 PSEUDOPHAKIA: ICD-10-CM

## 2024-04-23 DIAGNOSIS — H40.1133 PRIMARY OPEN ANGLE GLAUCOMA (POAG) OF BOTH EYES, SEVERE STAGE: ICD-10-CM

## 2024-04-23 DIAGNOSIS — H16.212 EXPOSURE KERATOCONJUNCTIVITIS OF EYE, LEFT: Primary | ICD-10-CM

## 2024-04-23 PROCEDURE — 99213 OFFICE O/P EST LOW 20 MIN: CPT | Mod: PBBFAC | Performed by: OPHTHALMOLOGY

## 2024-04-23 PROCEDURE — G2211 COMPLEX E/M VISIT ADD ON: HCPCS | Mod: S$PBB,,, | Performed by: OPHTHALMOLOGY

## 2024-04-23 PROCEDURE — 99214 OFFICE O/P EST MOD 30 MIN: CPT | Mod: S$PBB,,, | Performed by: OPHTHALMOLOGY

## 2024-04-23 PROCEDURE — 99999 PR PBB SHADOW E&M-EST. PATIENT-LVL III: CPT | Mod: PBBFAC,,, | Performed by: OPHTHALMOLOGY

## 2024-04-23 NOTE — PROGRESS NOTES
HPI    Pt states he has been experiencing a fb sensation in OS for about 1 week   now. No pain. No discharge.    PCIOL OU  POAG   Multiple Trabs- OS x's 2, OD x's 1     Latanoprost QHS OU  PF At's QID OU     Last edited by Moise Zaragoza on 4/23/2024  8:43 AM.            Assessment /Plan     For exam results, see Encounter Report.    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  Also discussed options utilizing medications.  Use EES q HS x 1 week OU      Advanced POAG  Ayalla    CCT  444 // 443    Low teens --> achieved    Both eyes --> tolerating well & good adherence --> CSM --> [facility] from [facility]  Xal q hs    Simbrinza BID --> Not using // GI Upset  Alphagan BID --> Not using // GI Upset     No BB --> Pulm Sarcoid 02 dependent & Oral steroids    SP Trabs  OD x 1  OS x 2    SP CE IOL OU P Daniel  Patient happy with improved VA  Quiet  Observe    Blebitis precautions were discussed with patient/family and to call and return to clinic immediately for redness, pain, decreasing vision, lid swelling and discharge in eye with previous glaucoma surgery.      Plan  RTC 4 months IOP & Adherence -->  OCT RNFL & HVF Faster Use New MRx since CE IOL --> then DFE  RTC sooner prn with good understanding

## 2024-07-15 ENCOUNTER — OFFICE VISIT (OUTPATIENT)
Dept: OPHTHALMOLOGY | Facility: CLINIC | Age: 74
End: 2024-07-15
Payer: OTHER GOVERNMENT

## 2024-07-15 DIAGNOSIS — H02.202 LAGOPHTHALMOS OF BOTH LOWER EYELIDS: ICD-10-CM

## 2024-07-15 DIAGNOSIS — H02.205 LAGOPHTHALMOS OF BOTH LOWER EYELIDS: ICD-10-CM

## 2024-07-15 DIAGNOSIS — H54.10 BLINDNESS AND LOW VISION: ICD-10-CM

## 2024-07-15 DIAGNOSIS — H40.1133 PRIMARY OPEN ANGLE GLAUCOMA (POAG) OF BOTH EYES, SEVERE STAGE: Primary | ICD-10-CM

## 2024-07-15 DIAGNOSIS — Z98.83 GLAUCOMA FILTERING BLEB OF BOTH EYES: ICD-10-CM

## 2024-07-15 DIAGNOSIS — Z96.1 PSEUDOPHAKIA: ICD-10-CM

## 2024-07-15 DIAGNOSIS — H04.123 DRY EYE SYNDROME OF BOTH EYES: ICD-10-CM

## 2024-07-15 PROCEDURE — 92133 CPTRZD OPH DX IMG PST SGM ON: CPT | Mod: PBBFAC,PO | Performed by: OPHTHALMOLOGY

## 2024-07-15 PROCEDURE — 99213 OFFICE O/P EST LOW 20 MIN: CPT | Mod: PBBFAC,PO,25 | Performed by: OPHTHALMOLOGY

## 2024-07-15 PROCEDURE — G2211 COMPLEX E/M VISIT ADD ON: HCPCS | Mod: S$PBB,,, | Performed by: OPHTHALMOLOGY

## 2024-07-15 PROCEDURE — 99214 OFFICE O/P EST MOD 30 MIN: CPT | Mod: S$PBB,,, | Performed by: OPHTHALMOLOGY

## 2024-07-15 PROCEDURE — 92083 EXTENDED VISUAL FIELD XM: CPT | Mod: PBBFAC,PO | Performed by: OPHTHALMOLOGY

## 2024-07-15 PROCEDURE — 99999 PR PBB SHADOW E&M-EST. PATIENT-LVL III: CPT | Mod: PBBFAC,,, | Performed by: OPHTHALMOLOGY

## 2024-07-15 RX ORDER — LATANOPROST 50 UG/ML
1 SOLUTION/ DROPS OPHTHALMIC NIGHTLY
COMMUNITY

## 2024-07-15 NOTE — PROGRESS NOTES
HPI     Glaucoma     Additional comments: 3 mon iop chk/hvf rev/oct           Comments    Pt states he feels like his va becomes blurry after reading at near at   times, lately.    PCIOL OU  POAG   Multiple Trabs- OS x's 2, OD x's 1     Latanoprost QHS OU  PF At's QID OU             Last edited by Moise Zaragoza on 7/15/2024  9:27 AM.            Assessment /Plan     For exam results, see Encounter Report.    Primary open angle glaucoma (POAG) of both eyes, severe stage  -     Marquis Visual Field - OU - Extended - Both Eyes  -     Posterior Segment OCT Optic Nerve- Both eyes    Dry eye syndrome of both eyes    Blindness and low vision    Pseudophakia    Glaucoma filtering bleb of both eyes    Lagophthalmos of both lower eyelids        Pulm Sarcoid O2 dependent --> increasing difficulty    04/23/2024 & 07/15/2024 --> re-discussed  + Exposure keratopathy OS  + Inf Scleral show c Lag OU  Dry Eye Syndrome: discussed use of warm compresses, preserved & non-preserved artificial tears, gel and PM ointment options.  Also discussed options utilizing medications.  Use EES q HS x 1 week OU      Advanced POAG  Ayalla    Not HVF taker  ==> use OCT RNFL    CCT  444 // 443    Low teens --> achieved    Both eyes --> tolerating well & good adherence --> CSM discussed --> [facility]  Xal q hs    Simbrinza BID --> Not using // GI Upset  Alphagan BID --> Not using // GI Upset     No BB --> Pulm Sarcoid 02 dependent & Oral steroids    SP Trabs  OD x 1  OS x 2    SP CE IOL OU P Daniel  Patient happy with improved VA  Quiet  Observe    Blebitis precautions were discussed with patient/family and to call and return to clinic immediately for redness, pain, decreasing vision, lid swelling and discharge in eye with previous glaucoma surgery.      Plan  RTC 4 months Gonio, IOP & Adherence --> then gonio  RTC sooner prn with good understanding

## 2025-03-21 DIAGNOSIS — H40.1133 PRIMARY OPEN-ANGLE GLAUCOMA, BILATERAL, SEVERE STAGE: Primary | ICD-10-CM

## 2025-07-21 ENCOUNTER — OFFICE VISIT (OUTPATIENT)
Dept: OPHTHALMOLOGY | Facility: CLINIC | Age: 75
End: 2025-07-21
Payer: OTHER GOVERNMENT

## 2025-07-21 DIAGNOSIS — H04.123 DRY EYE SYNDROME OF BOTH EYES: ICD-10-CM

## 2025-07-21 DIAGNOSIS — H02.205 LAGOPHTHALMOS OF BOTH LOWER EYELIDS: ICD-10-CM

## 2025-07-21 DIAGNOSIS — H54.10 BLINDNESS AND LOW VISION: ICD-10-CM

## 2025-07-21 DIAGNOSIS — Z98.83 GLAUCOMA FILTERING BLEB OF BOTH EYES: ICD-10-CM

## 2025-07-21 DIAGNOSIS — H02.202 LAGOPHTHALMOS OF BOTH LOWER EYELIDS: ICD-10-CM

## 2025-07-21 DIAGNOSIS — H40.1133 PRIMARY OPEN-ANGLE GLAUCOMA, BILATERAL, SEVERE STAGE: ICD-10-CM

## 2025-07-21 DIAGNOSIS — Z96.1 PSEUDOPHAKIA: ICD-10-CM

## 2025-07-21 DIAGNOSIS — H40.1133 PRIMARY OPEN ANGLE GLAUCOMA (POAG) OF BOTH EYES, SEVERE STAGE: Primary | ICD-10-CM

## 2025-07-21 PROCEDURE — 99999 PR PBB SHADOW E&M-EST. PATIENT-LVL III: CPT | Mod: PBBFAC,,, | Performed by: OPHTHALMOLOGY

## 2025-07-21 PROCEDURE — 92020 GONIOSCOPY: CPT | Mod: PBBFAC,PO | Performed by: OPHTHALMOLOGY

## 2025-07-21 PROCEDURE — 99214 OFFICE O/P EST MOD 30 MIN: CPT | Mod: S$PBB,,, | Performed by: OPHTHALMOLOGY

## 2025-07-21 PROCEDURE — G2211 COMPLEX E/M VISIT ADD ON: HCPCS | Mod: ,,, | Performed by: OPHTHALMOLOGY

## 2025-07-21 PROCEDURE — 92020 GONIOSCOPY: CPT | Mod: S$PBB,,, | Performed by: OPHTHALMOLOGY

## 2025-07-21 PROCEDURE — 99213 OFFICE O/P EST LOW 20 MIN: CPT | Mod: PBBFAC,PO | Performed by: OPHTHALMOLOGY

## 2025-07-21 NOTE — PROGRESS NOTES
HPI    DLS: 07/15/2024  Pt is overdue for follow up  Pt was supposed to have Gonio & IOP last visit   Pt reports good compliance with drops-slight blurry when reading -uses   readers      PCIOL OU   POAG   Multiple Trabs- OS x's 2, OD x's 1     Latanoprost QHS OU   PF At's QID OU     Last edited by Kristine Bowman MA on 7/21/2025  1:20 PM.            Assessment /Plan     For exam results, see Encounter Report.    Primary open angle glaucoma (POAG) of both eyes, severe stage    Primary open-angle glaucoma, bilateral, severe stage  -     Ambulatory referral/consult to Ophthalmology    Pseudophakia    Dry eye syndrome of both eyes    Blindness and low vision    Lagophthalmos of both lower eyelids    Glaucoma filtering bleb of both eyes        LTFU 07/15/2024 --> 07/21/2025  Discussed silent disease      [facility]  Pulm Sarcoid O2 dependent --> increasing difficulty      Advanced POAG  Ayalla    Not HVF taker  ==> use OCT RNFL      CCT  444 // 443    Low teens --> achieved c thin CCT    Both eyes --> tolerating well & good adherence --> CSM & discussed  Xal q hs    Simbrinza BID --> Not using // GI Upset  Alphagan BID --> Not using // GI Upset     No BB --> Pulm Sarcoid 02 dependent & Oral steroids    SP Trabs  OD x 1  OS x 2    SP CE IOL OU P Sanchez  Patient happy with improved VA  Quiet  Observe      + Inf Scleral show c Lag OU  Dry Eye Syndrome: discussed use of warm compresses, preserved & non-preserved artificial tears, gel and PM ointment options.  Use EES q HS x 1 week OU    Blebitis precautions were discussed with patient/family and to call and return to clinic immediately for redness, pain, decreasing vision, lid swelling and discharge in eye with previous glaucoma surgery.      Plan  RTC 4 months IOP & Adherence --> try HVF faster & OCT RNFL  RTC sooner prn with good understanding

## (undated) DEVICE — SOL BETADINE 5%

## (undated) DEVICE — SYR SLIP TIP 1CC

## (undated) DEVICE — SOL POVIDONE SCRUB IODINE 4 OZ

## (undated) DEVICE — Device

## (undated) DEVICE — GLASSES EYE PROTECTIVE

## (undated) DEVICE — GLOVE BIOGEL SKINSENSE PI 7.5

## (undated) DEVICE — SHIELD EYE ALUM FOX W/ CLOTH